# Patient Record
Sex: MALE | Race: WHITE | Employment: OTHER | URBAN - METROPOLITAN AREA
[De-identification: names, ages, dates, MRNs, and addresses within clinical notes are randomized per-mention and may not be internally consistent; named-entity substitution may affect disease eponyms.]

---

## 2017-12-20 PROBLEM — N40.1 BPH WITH OBSTRUCTION/LOWER URINARY TRACT SYMPTOMS: Status: ACTIVE | Noted: 2017-12-20

## 2017-12-20 PROBLEM — N48.6 PEYRONIE DISEASE: Status: ACTIVE | Noted: 2017-12-20

## 2017-12-20 PROBLEM — R97.20 ELEVATED PSA: Status: ACTIVE | Noted: 2017-12-20

## 2017-12-20 PROBLEM — N13.8 BPH WITH OBSTRUCTION/LOWER URINARY TRACT SYMPTOMS: Status: ACTIVE | Noted: 2017-12-20

## 2023-01-26 PROBLEM — N52.8 OTHER MALE ERECTILE DYSFUNCTION: Status: ACTIVE | Noted: 2023-01-26

## 2023-04-04 ENCOUNTER — ANESTHESIA EVENT (OUTPATIENT)
Dept: OPERATING ROOM | Age: 73
End: 2023-04-04
Payer: MEDICARE

## 2023-04-04 ENCOUNTER — HOSPITAL ENCOUNTER (OUTPATIENT)
Dept: ULTRASOUND IMAGING | Age: 73
Setting detail: OUTPATIENT SURGERY
Discharge: HOME | End: 2023-04-06
Attending: SPECIALIST
Payer: MEDICARE

## 2023-04-04 ENCOUNTER — ANESTHESIA (OUTPATIENT)
Dept: OPERATING ROOM | Age: 73
End: 2023-04-04
Payer: MEDICARE

## 2023-04-04 ENCOUNTER — HOSPITAL ENCOUNTER (OUTPATIENT)
Age: 73
Setting detail: OUTPATIENT SURGERY
Discharge: HOME | End: 2023-04-04
Attending: SPECIALIST | Admitting: SPECIALIST
Payer: MEDICARE

## 2023-04-04 VITALS
HEART RATE: 53 BPM | WEIGHT: 200 LBS | BODY MASS INDEX: 27.09 KG/M2 | TEMPERATURE: 97.3 F | SYSTOLIC BLOOD PRESSURE: 142 MMHG | DIASTOLIC BLOOD PRESSURE: 78 MMHG | RESPIRATION RATE: 19 BRPM | HEIGHT: 72 IN | OXYGEN SATURATION: 98 %

## 2023-04-04 DIAGNOSIS — R97.20 ELEVATED PSA: ICD-10-CM

## 2023-04-04 LAB
EGFR, POC: >60 ML/MIN/1.73M2
GLUCOSE BLD-MCNC: 105 MG/DL (ref 74–100)
POC BUN: 13 MG/DL (ref 8–26)
POC CHLORIDE: 101 MMOL/L (ref 98–107)
POC CREATININE: 0.79 MG/DL (ref 0.51–1.19)
POC IONIZED CALCIUM: 1.28 MMOL/L (ref 1.15–1.33)
POC POTASSIUM: 3.7 MMOL/L (ref 3.5–4.5)
POC SODIUM: 139 MMOL/L (ref 138–146)

## 2023-04-04 PROCEDURE — 76942 ECHO GUIDE FOR BIOPSY: CPT

## 2023-04-04 PROCEDURE — 82435 ASSAY OF BLOOD CHLORIDE: CPT

## 2023-04-04 PROCEDURE — 82565 ASSAY OF CREATININE: CPT

## 2023-04-04 PROCEDURE — 3700000001 HC ADD 15 MINUTES (ANESTHESIA): Performed by: SPECIALIST

## 2023-04-04 PROCEDURE — 84295 ASSAY OF SERUM SODIUM: CPT

## 2023-04-04 PROCEDURE — 82947 ASSAY GLUCOSE BLOOD QUANT: CPT

## 2023-04-04 PROCEDURE — 6360000002 HC RX W HCPCS: Performed by: REGISTERED NURSE

## 2023-04-04 PROCEDURE — 7100000010 HC PHASE II RECOVERY - FIRST 15 MIN: Performed by: SPECIALIST

## 2023-04-04 PROCEDURE — 2580000003 HC RX 258: Performed by: REGISTERED NURSE

## 2023-04-04 PROCEDURE — 2580000003 HC RX 258: Performed by: SPECIALIST

## 2023-04-04 PROCEDURE — 6360000002 HC RX W HCPCS: Performed by: SPECIALIST

## 2023-04-04 PROCEDURE — 84132 ASSAY OF SERUM POTASSIUM: CPT

## 2023-04-04 PROCEDURE — 88305 TISSUE EXAM BY PATHOLOGIST: CPT

## 2023-04-04 PROCEDURE — 7100000011 HC PHASE II RECOVERY - ADDTL 15 MIN: Performed by: SPECIALIST

## 2023-04-04 PROCEDURE — 3600000012 HC SURGERY LEVEL 2 ADDTL 15MIN: Performed by: SPECIALIST

## 2023-04-04 PROCEDURE — 82330 ASSAY OF CALCIUM: CPT

## 2023-04-04 PROCEDURE — 2500000003 HC RX 250 WO HCPCS: Performed by: SPECIALIST

## 2023-04-04 PROCEDURE — 3700000000 HC ANESTHESIA ATTENDED CARE: Performed by: SPECIALIST

## 2023-04-04 PROCEDURE — 2500000003 HC RX 250 WO HCPCS: Performed by: REGISTERED NURSE

## 2023-04-04 PROCEDURE — 2709999900 HC NON-CHARGEABLE SUPPLY: Performed by: SPECIALIST

## 2023-04-04 PROCEDURE — 84520 ASSAY OF UREA NITROGEN: CPT

## 2023-04-04 PROCEDURE — 3600000002 HC SURGERY LEVEL 2 BASE: Performed by: SPECIALIST

## 2023-04-04 RX ORDER — SODIUM CHLORIDE, SODIUM LACTATE, POTASSIUM CHLORIDE, CALCIUM CHLORIDE 600; 310; 30; 20 MG/100ML; MG/100ML; MG/100ML; MG/100ML
INJECTION, SOLUTION INTRAVENOUS CONTINUOUS
Status: DISCONTINUED | OUTPATIENT
Start: 2023-04-04 | End: 2023-04-04 | Stop reason: HOSPADM

## 2023-04-04 RX ORDER — LIDOCAINE HYDROCHLORIDE 10 MG/ML
INJECTION, SOLUTION EPIDURAL; INFILTRATION; INTRACAUDAL; PERINEURAL PRN
Status: DISCONTINUED | OUTPATIENT
Start: 2023-04-04 | End: 2023-04-04 | Stop reason: ALTCHOICE

## 2023-04-04 RX ORDER — LIDOCAINE HYDROCHLORIDE 10 MG/ML
INJECTION, SOLUTION EPIDURAL; INFILTRATION; INTRACAUDAL; PERINEURAL PRN
Status: DISCONTINUED | OUTPATIENT
Start: 2023-04-04 | End: 2023-04-04 | Stop reason: SDUPTHER

## 2023-04-04 RX ORDER — SODIUM CHLORIDE, SODIUM LACTATE, POTASSIUM CHLORIDE, CALCIUM CHLORIDE 600; 310; 30; 20 MG/100ML; MG/100ML; MG/100ML; MG/100ML
INJECTION, SOLUTION INTRAVENOUS CONTINUOUS PRN
Status: DISCONTINUED | OUTPATIENT
Start: 2023-04-04 | End: 2023-04-04 | Stop reason: SDUPTHER

## 2023-04-04 RX ORDER — PROPOFOL 10 MG/ML
INJECTION, EMULSION INTRAVENOUS CONTINUOUS PRN
Status: DISCONTINUED | OUTPATIENT
Start: 2023-04-04 | End: 2023-04-04 | Stop reason: SDUPTHER

## 2023-04-04 RX ADMIN — PROPOFOL 150 MCG/KG/MIN: 10 INJECTION, EMULSION INTRAVENOUS at 08:40

## 2023-04-04 RX ADMIN — CEFTRIAXONE SODIUM 1000 MG: 1 INJECTION, POWDER, FOR SOLUTION INTRAMUSCULAR; INTRAVENOUS at 08:44

## 2023-04-04 RX ADMIN — LIDOCAINE HYDROCHLORIDE 50 MG: 10 INJECTION, SOLUTION EPIDURAL; INFILTRATION; INTRACAUDAL; PERINEURAL at 08:39

## 2023-04-04 RX ADMIN — SODIUM CHLORIDE, POTASSIUM CHLORIDE, SODIUM LACTATE AND CALCIUM CHLORIDE: 600; 310; 30; 20 INJECTION, SOLUTION INTRAVENOUS at 08:25

## 2023-04-04 RX ADMIN — SODIUM CHLORIDE, POTASSIUM CHLORIDE, SODIUM LACTATE AND CALCIUM CHLORIDE: 600; 310; 30; 20 INJECTION, SOLUTION INTRAVENOUS at 08:55

## 2023-04-04 ASSESSMENT — PAIN SCALES - GENERAL
PAINLEVEL_OUTOF10: 4
PAINLEVEL_OUTOF10: 0
PAINLEVEL_OUTOF10: 4
PAINLEVEL_OUTOF10: 4

## 2023-04-04 ASSESSMENT — PAIN - FUNCTIONAL ASSESSMENT: PAIN_FUNCTIONAL_ASSESSMENT: NONE - DENIES PAIN

## 2023-04-04 ASSESSMENT — PAIN DESCRIPTION - PAIN TYPE
TYPE: SURGICAL PAIN

## 2023-04-04 ASSESSMENT — PAIN DESCRIPTION - LOCATION
LOCATION: RECTUM

## 2023-04-04 ASSESSMENT — PAIN DESCRIPTION - DESCRIPTORS
DESCRIPTORS: DISCOMFORT

## 2023-04-04 ASSESSMENT — LIFESTYLE VARIABLES: SMOKING_STATUS: 1

## 2023-04-04 NOTE — H&P
Urology History and Physical    Patient:  Jamin Alvarez  MRN: [id number]  YOB: 1950    CHIEF COMPLAINT:  elevated PSA    HISTORY OF PRESENT ILLNESS:   The patient is a 71 y.o. male    Patient is here today for history of an elevated PSA. His last several PSA values are as follows:        Lab Results   Component Value Date     PSA 8.45 11/16/2022     PSA 4.52 11/04/2017     PSA 5.19 10/27/2017     PSA 5.19 10/27/2017     Patient has a suspicious 11 mm lesion on recent prostate MRI. Patient's old records, notes and chart reviewed and summarized above. Past Medical History:    Past Medical History:   Diagnosis Date    Arthritis     general    BPH with obstruction/lower urinary tract symptoms     COVID-19 12/2020    tired X 1 week, no treatment or hospitalization    Elevated PSA     Facial basal cell cancer     GERD (gastroesophageal reflux disease)     Hemorrhoids     Hyperlipidemia     Hypertension     Rectal fissure     Under care of team     Urology/Dr. Angelica Obrien MD/ [place]/ last seen 1-2023    Wears glasses     Wellness examination     PCP Seth Mock DO/[place], [place]/ last seen 1-2023       Past Surgical History:    Past Surgical History:   Procedure Laterality Date    COLONOSCOPY      TONSILLECTOMY         Medications:    Scheduled Meds:  Continuous Infusions:  PRN Meds:. Allergies:  Patient has no known allergies.     Social History:    Social History     Socioeconomic History    Marital status:      Spouse name: Not on file    Number of children: Not on file    Years of education: Not on file    Highest education level: Not on file   Occupational History    Not on file   Tobacco Use    Smoking status: Some Days     Types: Cigars    Smokeless tobacco: Never   Vaping Use    Vaping Use: Never used   Substance and Sexual Activity    Alcohol use: Yes     Comment: social    Drug use: Not Currently    Sexual activity: Yes     Partners: Female   Other Topics Concern    Not on file

## 2023-04-04 NOTE — OP NOTE
Kendra Love MD FACS    FACILITY:  [address] [place], [place]  DATE:  4/4/23  Josh Boudraeux       Surgeon: Morales Velasco MD   Assistant: Alexandra Xavier MD PGY3  Preoperative diagnosis: Elevated prostatic specific antigen. Abnormal prostate MRI. Postoperative diagnosis: Same  Procedure: Transrectal ultrasound of the prostate with fusion prostate biopsy. Anesthesia: MAC and local  Specimen: Prostate biopsy specimens  ID Type Source Tests Collected by Time Destination   A : PROSTATE TISSUE BIOPSY X 12 Tissue Prostate SURGICAL PATHOLOGY Augustina Alvarez MD 4/4/2023 0841    B : MID TO APEX TRANSITIONAL TARGET LEFT. Tissue Prostate SURGICAL PATHOLOGY Augustina Alvarez MD 4/4/2023 4215        COMPLICATIONS:  None. ESTIMATED BLOOD LOSS: Minimal   Drains: None  Follow-up: Dr Cedrick Love will call with the results    INDICATIONS:  Pt is a 72 yo M who has history of elevated PSA. He was found to have a 11 mm periurethral lesion lesion in MRI. He is here today for MRI ultrasound fusion biopsy,  the risks, benefits and alternatives were explained and informed consent was signed. OPERATIVE NARRATIVE:  The patient was brought to the operating room. He was properly identified. The procedure was confirmed verbally. The patient was administered a monitored anesthetic. He was placed in the lateral decubitus position. The ultrasound probe was placed into the rectum and prostatography ensued. The [employer]Â® workstation was coupled to the ultrasound probe and using the device, a series of ultrasonic images of the prostate, seminal vesicles and base of the bladder were obtained. These images were then subsequently reconstructed in 3D space using the [employer] [employer] The MRI images were imported to this workstation and subsequent fusion of ultrasound and MRI images were performed.   MR/ultrasound fusion prostate model planning and reconstruction then occurred and there was one region of

## 2023-04-04 NOTE — DISCHARGE INSTRUCTIONS
Prostate Biopsy Discharge Instructions:    Complete entire course of antibiotics as prescribed. Take prescriptions as directed. No driving while taking narcotic pain medication. Hold blood thinners after biopsy. May resume bloodthinners on 4/11/23  Please call attending physician or hospital  with questions. Please call or present to ED for fever >101 F, shaking, chills, intractable nausea and vomiting, or uncontrolled pain. OK to shower after discharge. You may see blood in your urine, stools, and semen for up to 6 weeks. This is normal.   Follow up with Dr. Rashaun Quach in 1-2 weeks to discuss biopsy results. Call office to confirm appointment. No alcoholic beverages, no driving or operating machinery, no making important decisions for 24 hours. You may have a normal diet but should eat lightly day of surgery. Drink plenty of fluids.   Urinate within 8 hours after surgery, if unable to urinate call your doctor

## 2023-04-04 NOTE — ANESTHESIA PRE PROCEDURE
Department of Anesthesiology  Preprocedure Note       Name:  Dann Pickett   Age:  71 y.o.  :  1950                                          MRN:  [id number]         Date:  2023      Surgeon: Karma Timmons):  Vane Hall MD    Procedure: Procedure(s):  MR FUSION PROSTATE BIOPSY ULTRASOUND    Medications prior to admission:   Prior to Admission medications    Medication Sig Start Date End Date Taking?  Authorizing Provider   ciprofloxacin (CIPRO) 500 MG tablet Take 1 tablet by mouth 2 times daily Take first dose night prior to prostate biopsy and then twice a day thereafter until completed 23   Vane Hall MD   cephALEXin (KEFLEX) 500 MG capsule Take 1 capsule by mouth 3 times daily Take first dose night prior to prostate biopsy and then three times a day thereafter until completed 23   Vane Hall MD   amLODIPine (NORVASC) 5 MG tablet Take 1 tablet by mouth daily    Historical Provider, MD   tadalafil (CIALIS) 20 MG tablet Take 1 tablet by mouth as needed for Erectile Dysfunction 23   Vane Hall MD   lisinopril-hydrochlorothiazide (PRINZIDE;ZESTORETIC) 20-12.5 MG per tablet Take 1 tablet by mouth daily 10/31/17   Historical Provider, MD       Current medications:    Current Facility-Administered Medications   Medication Dose Route Frequency Provider Last Rate Last Admin    cefTRIAXone (ROCEPHIN) 1,000 mg in sterile water 10 mL IV syringe  1,000 mg IntraVENous Once Vane Hall MD        lactated ringers IV soln infusion   IntraVENous Continuous Pedro Mcpherson MD           Allergies:  No Known Allergies    Problem List:    Patient Active Problem List   Diagnosis Code    Elevated PSA R97.20    BPH with obstruction/lower urinary tract symptoms N40.1, N13.8    Peyronie disease N48.6    Other male erectile dysfunction N52.8       Past Medical History:        Diagnosis Date    Arthritis     general    BPH with obstruction/lower urinary tract

## 2023-04-04 NOTE — ANESTHESIA POSTPROCEDURE EVALUATION
Department of Anesthesiology  Postprocedure Note    Patient: Shelly Paredes  MRN: [id number]  [place]: 1950  Date of evaluation: 4/4/2023      Procedure Summary     Date: 04/04/23 Room / Location: [address]    Anesthesia Start: 8236 Anesthesia Stop: 4563    Procedure: MR FUSION PROSTATE BIOPSY ULTRASOUND Diagnosis:       Elevated PSA      (ELEVATED PSA)    Surgeons: Erlinda Santo MD Responsible Provider: Dustin Meek MD    Anesthesia Type: MAC ASA Status: 2          Anesthesia Type: No value filed.     Vickey Phase I: Vickey Score: 10    Vickey Phase II: Vickey Score: 10      Anesthesia Post Evaluation    Patient location during evaluation: PACU  Patient participation: complete - patient participated  Level of consciousness: awake  Pain score: 4  Cardiovascular status: hemodynamically stable  Respiratory status: room air

## 2023-04-04 NOTE — LETTER
Winnie Kwon MD [facility], [address] [place] [address],[address]  [place], [facility]  P: [phone] / F: [phone]    Brief Postoperative Note  Surgical Facility: [address] [place] [address]   4/4/23    Екатерина Cruz  [id number]  1950    Dear Jarrett Ask, DO,    I've enclosed a Brief Op note on a procedure performed on your patient, Екатерина Cruz today. Pre-operative Diagnosis: Elevated PSA and abnormal prostate MRI  Post-operative Diagnosis: Same    Procedure: Prostate U/S and MR fusion guided biopsy of prostate      Anesthesia: MAC    Surgeon: Mer Luong; Resident: Taye Hung MD     Findings: Patient target lesion at anterior left/midline base biopsied as well as standard random template. Plan: Follow up depending on results of prostate biopsy pathology. Thank you for allowing me to participate in the care of this patient. I will keep you updated on this patient's follow up and I look forward to serving you and your patients again in the future.         Electronically signed by Blake Dunn MD, FACS 55

## 2023-04-06 LAB — SURGICAL PATHOLOGY REPORT: NORMAL

## 2023-04-14 ENCOUNTER — HOSPITAL ENCOUNTER (OUTPATIENT)
Dept: NUCLEAR MEDICINE | Age: 73
Discharge: HOME | End: 2023-04-16
Payer: MEDICARE

## 2023-04-14 DIAGNOSIS — C61 PROSTATE CANCER (HCC): ICD-10-CM

## 2023-04-14 PROCEDURE — A9595 HC RX CONTRAST MEDICATION: HCPCS | Performed by: SPECIALIST

## 2023-04-14 PROCEDURE — 78815 PET IMAGE W/CT SKULL-THIGH: CPT

## 2023-04-14 PROCEDURE — 6360000004 HC RX CONTRAST MEDICATION: Performed by: SPECIALIST

## 2023-04-14 PROCEDURE — 2580000003 HC RX 258: Performed by: SPECIALIST

## 2023-04-14 RX ORDER — SODIUM CHLORIDE 0.9 % (FLUSH) 0.9 %
10 SYRINGE (ML) INJECTION PRN
Status: DISCONTINUED | OUTPATIENT
Start: 2023-04-14 | End: 2023-04-17 | Stop reason: HOSPADM

## 2023-04-14 RX ADMIN — PIFLUFOLASTAT F-18 10.33 MILLICURIE: 80 INJECTION INTRAVENOUS at 12:41

## 2023-04-14 RX ADMIN — SODIUM CHLORIDE, PRESERVATIVE FREE 10 ML: 5 INJECTION INTRAVENOUS at 12:42

## 2023-04-21 PROBLEM — C61 PROSTATE CANCER (HCC): Status: ACTIVE | Noted: 2023-04-21

## 2023-06-01 RX ORDER — SODIUM CHLORIDE, SODIUM LACTATE, POTASSIUM CHLORIDE, CALCIUM CHLORIDE 600; 310; 30; 20 MG/100ML; MG/100ML; MG/100ML; MG/100ML
1000 INJECTION, SOLUTION INTRAVENOUS CONTINUOUS
OUTPATIENT
Start: 2023-06-01

## 2023-06-06 ENCOUNTER — HOSPITAL ENCOUNTER (OUTPATIENT)
Dept: PREADMISSION TESTING | Age: 73
Discharge: HOME | End: 2023-06-10
Payer: MEDICARE

## 2023-06-06 VITALS
HEART RATE: 50 BPM | SYSTOLIC BLOOD PRESSURE: 124 MMHG | HEIGHT: 72 IN | OXYGEN SATURATION: 98 % | DIASTOLIC BLOOD PRESSURE: 76 MMHG | TEMPERATURE: 97.3 F | WEIGHT: 202 LBS | RESPIRATION RATE: 18 BRPM | BODY MASS INDEX: 27.36 KG/M2

## 2023-06-06 LAB
ANION GAP SERPL CALCULATED.3IONS-SCNC: 12 MMOL/L (ref 9–17)
BUN SERPL-MCNC: 15 MG/DL (ref 8–23)
CHLORIDE SERPL-SCNC: 104 MMOL/L (ref 98–107)
CO2 SERPL-SCNC: 25 MMOL/L (ref 20–31)
CREAT SERPL-MCNC: 0.8 MG/DL (ref 0.7–1.2)
ERYTHROCYTE [DISTWIDTH] IN BLOOD BY AUTOMATED COUNT: 13.2 % (ref 11.8–14.4)
GFR SERPL CREATININE-BSD FRML MDRD: >60 ML/MIN/1.73M2
GLUCOSE SERPL-MCNC: 91 MG/DL (ref 70–99)
HCT VFR BLD AUTO: 42.7 % (ref 40.7–50.3)
HGB BLD-MCNC: 14.4 G/DL (ref 13–17)
MCH RBC QN AUTO: 31.4 PG (ref 25.2–33.5)
MCHC RBC AUTO-ENTMCNC: 33.7 G/DL (ref 28.4–34.8)
MCV RBC AUTO: 93 FL (ref 82.6–102.9)
NRBC AUTOMATED: 0 PER 100 WBC
PLATELET # BLD AUTO: 191 K/UL (ref 138–453)
PMV BLD AUTO: 10.3 FL (ref 8.1–13.5)
POTASSIUM SERPL-SCNC: 4.2 MMOL/L (ref 3.7–5.3)
RBC # BLD AUTO: 4.59 M/UL (ref 4.21–5.77)
SODIUM SERPL-SCNC: 141 MMOL/L (ref 135–144)
WBC OTHER # BLD: 5.4 K/UL (ref 3.5–11.3)

## 2023-06-06 PROCEDURE — 85027 COMPLETE CBC AUTOMATED: CPT

## 2023-06-06 PROCEDURE — 36415 COLL VENOUS BLD VENIPUNCTURE: CPT

## 2023-06-06 PROCEDURE — 82565 ASSAY OF CREATININE: CPT

## 2023-06-06 PROCEDURE — 82947 ASSAY GLUCOSE BLOOD QUANT: CPT

## 2023-06-06 PROCEDURE — 80051 ELECTROLYTE PANEL: CPT

## 2023-06-06 PROCEDURE — 84520 ASSAY OF UREA NITROGEN: CPT

## 2023-06-06 RX ORDER — ROSUVASTATIN CALCIUM 40 MG/1
40 TABLET, COATED ORAL EVERY EVENING
COMMUNITY

## 2023-06-06 RX ORDER — TELMISARTAN AND HYDROCHLORTHIAZIDE 80; 25 MG/1; MG/1
1 TABLET ORAL DAILY
COMMUNITY

## 2023-06-06 NOTE — PROGRESS NOTES
Anesthesia Focused Assessment    Hx of anesthesia complications:  no  Family hx of anesthesia complications:  no      Tested positive for Covid-19 in last 8 weeks: no      STOP-BANG Sleep Apnea Questionnaire    SNORE loudly (heard through closed doors)? Yes  TIRED, fatigued, sleepy during daytime? No  OBSERVED stopping breathing during sleep? No  High blood PRESSURE or being treated? Yes    BMI over 35? No  AGE over 48? Yes  NECK circumference over 16\"? No  GENDER (male)? Yes             Total 4  High risk 5-8  Intermediate risk 3-4  Low risk 0-2    ----------------------------------------------------------------------------------------------------------------------  JOSEPH                              No  If yes, machine? DM1                                            No  DM2                   No    Coronary Artery Disease      No  HTN         Yes, on meds  Defib/AICD/Pacemaker               No             Renal Failure                   No  If yes, on dialysis           Active smoker? Yes, cigars 3-4 per week  Drinks alcohol? Yes, occasional  Illicit drugs?         No  Dentition?        benign      Past Medical History:   Diagnosis Date    Arthritis     general    BPH with obstruction/lower urinary tract symptoms     COVID-19 12/2020    tired X 1 week, no treatment or hospitalization    Elevated PSA     Facial basal cell cancer     GERD (gastroesophageal reflux disease)     Hemorrhoids     Hyperlipidemia     Hypertension     Rectal fissure     TIA (transient ischemic attack)     states possible one due to vision changes lasting 30 seconds-- early 2000's    Under care of service provider 06/06/2023    [id number] to visit june 2023    Under care of team 06/06/2023    Urology/Dr. Anton Boss MD/ [place]/ due to visit june 2023    Wears glasses     Wellness examination 06/06/2023    PCP Jackeline Monroe DO/[place]/ last seen 1-2023         Patient was

## 2023-06-07 ENCOUNTER — INITIAL CONSULT (OUTPATIENT)
Age: 73
End: 2023-06-07
Payer: MEDICARE

## 2023-06-07 DIAGNOSIS — C61 PROSTATE CANCER (HCC): Primary | ICD-10-CM

## 2023-06-07 PROCEDURE — 1123F ACP DISCUSS/DSCN MKR DOCD: CPT | Performed by: SPECIALIST

## 2023-06-07 PROCEDURE — 99213 OFFICE O/P EST LOW 20 MIN: CPT | Performed by: SPECIALIST

## 2023-06-07 NOTE — PROGRESS NOTES
06/07/23. Last several PSA's:  Lab Results   Component Value Date    PSA 8.45 11/16/2022    PSA 4.52 11/04/2017    PSA 5.19 10/27/2017    PSA 5.19 10/27/2017       Last total testosterone:  No results found for: TESTOSTERONE    Last BUN and creatinine:  Lab Results   Component Value Date    BUN 15 06/06/2023     Lab Results   Component Value Date    CREATININE 0.80 06/06/2023       Last CBC:  Lab Results   Component Value Date    WBC 5.4 06/06/2023    HGB 14.4 06/06/2023    HCT 42.7 06/06/2023    MCV 93.0 06/06/2023     06/06/2023       Additional Lab/Culture results: none    Imaging Reviewed during this Office Visit: none  (results were independently reviewed by physician and radiology report verified)    Physical Exam:    There were no vitals filed for this visit. There is no height or weight on file to calculate BMI. Patient is a 71 y.o. male in no acute distress and alert and oriented to person, place and time. Assessment and Plan      1. Prostate cancer [facility])           Plan:      Here is here today to discuss his upcoming [place] prostatectomy at [facility] on 6/16/23. I discussed the various risks and benefits of robotic assisted laparoscopic prostatectomy and the patient understands and signed the informed consent form. We discussed the need for a 1 day preoperative bowel prep and he was given verbal and written instructions regarding this. We also discussed the benefit of doing pelvic floor muscle exercises (Kegel exercises) preop and postoperatively to speed up recovery of his continence. Ada Shore MD FACS  6/7/2023 8:32 PM    2023 Quality Measure Documentation: N/A  Social History     Tobacco Use   Smoking Status Some Days    Types: Cigars   Smokeless Tobacco Never     (If patient a smoker, smoking cessation counseling offered)

## 2023-06-15 NOTE — H&P
[facility] Urology  Calin Marin. Alexey Brown MD [facility]    Pre-op History and Physical      Patient:  Aleida Sewell  MRN: [id number]  YOB: 1950    HISTORY OF PRESENT ILLNESS:     The patient is a 71 y.o. male who presents with unfavorable intermediate prostate cancer. Here for robotic laparoscopic radical prostatectomy with possible bilateral pelvic lymph node dissection. Patient's old records, notes and chart reviewed and summarized above. Past Medical History:    Past Medical History:   Diagnosis Date    Arthritis     general    BPH with obstruction/lower urinary tract symptoms     COVID-19 12/2020    tired X 1 week, no treatment or hospitalization    Elevated PSA     Facial basal cell cancer     GERD (gastroesophageal reflux disease)     Hemorrhoids     Hyperlipidemia     Hypertension     Rectal fissure     TIA (transient ischemic attack)     states possible one due to vision changes lasting 30 seconds-- early 2000's    Under care of service provider 06/06/2023    [id number] to visit june 2023    Under care of team 06/06/2023    Urology/Dr. Alexey Brown MD/ [place]/ due to visit june 2023    Wears glasses     Wellness examination 06/06/2023    PCP Moni Salcido DO/[place]/ last seen 1-2023       Past Surgical History:    Past Surgical History:   Procedure Laterality Date    COLONOSCOPY      PROSTATE BIOPSY  04/04/2023    PROSTATE SURGERY N/A 4/4/2023    MR FUSION PROSTATE BIOPSY ULTRASOUND performed by Jigna Gómez MD at [facility]         Medications Prior to Admission:    Prior to Admission medications    Medication Sig Start Date End Date Taking?  Authorizing Provider   rosuvastatin (CRESTOR) 40 MG tablet Take 1 tablet by mouth every evening    Historical Provider, MD   telmisartan-hydroCHLOROthiazide (MICARDIS HCT) 80-25 MG per tablet Take 1 tablet by mouth daily    Historical Provider, MD   amLODIPine (NORVASC) 10 MG tablet Take 1 tablet by

## 2023-06-16 ENCOUNTER — HOSPITAL ENCOUNTER (OUTPATIENT)
Age: 73
Setting detail: OBSERVATION
Discharge: HOME | End: 2023-06-17
Attending: SPECIALIST | Admitting: SPECIALIST
Payer: MEDICARE

## 2023-06-16 DIAGNOSIS — C61 PROSTATE CANCER (HCC): ICD-10-CM

## 2023-06-16 DIAGNOSIS — G89.18 ACUTE POST-OPERATIVE PAIN: Primary | ICD-10-CM

## 2023-06-16 LAB
ANION GAP SERPL CALCULATED.3IONS-SCNC: 12 MMOL/L (ref 9–17)
BUN SERPL-MCNC: 21 MG/DL (ref 8–23)
CALCIUM SERPL-MCNC: 8.9 MG/DL (ref 8.6–10.4)
CHLORIDE SERPL-SCNC: 99 MMOL/L (ref 98–107)
CO2 SERPL-SCNC: 23 MMOL/L (ref 20–31)
CREAT SERPL-MCNC: 0.97 MG/DL (ref 0.7–1.2)
ERYTHROCYTE [DISTWIDTH] IN BLOOD BY AUTOMATED COUNT: 12.9 % (ref 11.8–14.4)
GFR SERPL CREATININE-BSD FRML MDRD: >60 ML/MIN/1.73M2
GLUCOSE SERPL-MCNC: 160 MG/DL (ref 70–99)
HCT VFR BLD AUTO: 41 % (ref 40.7–50.3)
HGB BLD-MCNC: 14 G/DL (ref 13–17)
MCH RBC QN AUTO: 31.4 PG (ref 25.2–33.5)
MCHC RBC AUTO-ENTMCNC: 34.1 G/DL (ref 28.4–34.8)
MCV RBC AUTO: 91.9 FL (ref 82.6–102.9)
NRBC AUTOMATED: 0 PER 100 WBC
PLATELET # BLD AUTO: 176 K/UL (ref 138–453)
PMV BLD AUTO: 10.4 FL (ref 8.1–13.5)
POTASSIUM BLD-SCNC: 3.9 MMOL/L (ref 3.5–4.5)
POTASSIUM SERPL-SCNC: 3.6 MMOL/L (ref 3.7–5.3)
RBC # BLD AUTO: 4.46 M/UL (ref 4.21–5.77)
SODIUM SERPL-SCNC: 134 MMOL/L (ref 135–144)
WBC OTHER # BLD: 11.9 K/UL (ref 3.5–11.3)

## 2023-06-16 PROCEDURE — 85027 COMPLETE CBC AUTOMATED: CPT

## 2023-06-16 PROCEDURE — 6360000002 HC RX W HCPCS: Performed by: STUDENT IN AN ORGANIZED HEALTH CARE EDUCATION/TRAINING PROGRAM

## 2023-06-16 PROCEDURE — 2500000003 HC RX 250 WO HCPCS: Performed by: SPECIALIST

## 2023-06-16 PROCEDURE — 3700000000 HC ANESTHESIA ATTENDED CARE: Performed by: SPECIALIST

## 2023-06-16 PROCEDURE — G0378 HOSPITAL OBSERVATION PER HR: HCPCS

## 2023-06-16 PROCEDURE — 2709999900 HC NON-CHARGEABLE SUPPLY: Performed by: SPECIALIST

## 2023-06-16 PROCEDURE — 6370000000 HC RX 637 (ALT 250 FOR IP): Performed by: STUDENT IN AN ORGANIZED HEALTH CARE EDUCATION/TRAINING PROGRAM

## 2023-06-16 PROCEDURE — 2580000003 HC RX 258: Performed by: ANESTHESIOLOGY

## 2023-06-16 PROCEDURE — 3700000001 HC ADD 15 MINUTES (ANESTHESIA): Performed by: SPECIALIST

## 2023-06-16 PROCEDURE — 3600000019 HC SURGERY ROBOT ADDTL 15MIN: Performed by: SPECIALIST

## 2023-06-16 PROCEDURE — 2580000003 HC RX 258: Performed by: STUDENT IN AN ORGANIZED HEALTH CARE EDUCATION/TRAINING PROGRAM

## 2023-06-16 PROCEDURE — 88307 TISSUE EXAM BY PATHOLOGIST: CPT

## 2023-06-16 PROCEDURE — 88305 TISSUE EXAM BY PATHOLOGIST: CPT

## 2023-06-16 PROCEDURE — 7100000000 HC PACU RECOVERY - FIRST 15 MIN: Performed by: SPECIALIST

## 2023-06-16 PROCEDURE — 84132 ASSAY OF SERUM POTASSIUM: CPT

## 2023-06-16 PROCEDURE — 3600000009 HC SURGERY ROBOT BASE: Performed by: SPECIALIST

## 2023-06-16 PROCEDURE — 96372 THER/PROPH/DIAG INJ SC/IM: CPT

## 2023-06-16 PROCEDURE — 80048 BASIC METABOLIC PNL TOTAL CA: CPT

## 2023-06-16 PROCEDURE — S2900 ROBOTIC SURGICAL SYSTEM: HCPCS | Performed by: SPECIALIST

## 2023-06-16 PROCEDURE — 88309 TISSUE EXAM BY PATHOLOGIST: CPT

## 2023-06-16 PROCEDURE — 6370000000 HC RX 637 (ALT 250 FOR IP): Performed by: SPECIALIST

## 2023-06-16 PROCEDURE — 87086 URINE CULTURE/COLONY COUNT: CPT

## 2023-06-16 PROCEDURE — 2580000003 HC RX 258: Performed by: SPECIALIST

## 2023-06-16 PROCEDURE — 2720000010 HC SURG SUPPLY STERILE: Performed by: SPECIALIST

## 2023-06-16 PROCEDURE — C1889 IMPLANT/INSERT DEVICE, NOC: HCPCS | Performed by: SPECIALIST

## 2023-06-16 PROCEDURE — 7100000001 HC PACU RECOVERY - ADDTL 15 MIN: Performed by: SPECIALIST

## 2023-06-16 DEVICE — CLIP INT L POLYMER LOK LIG HEM O LOK: Type: IMPLANTABLE DEVICE | Status: FUNCTIONAL

## 2023-06-16 RX ORDER — SODIUM CHLORIDE 0.9 % (FLUSH) 0.9 %
5-40 SYRINGE (ML) INJECTION PRN
Status: DISCONTINUED | OUTPATIENT
Start: 2023-06-16 | End: 2023-06-16 | Stop reason: HOSPADM

## 2023-06-16 RX ORDER — MAGNESIUM HYDROXIDE 1200 MG/15ML
LIQUID ORAL CONTINUOUS PRN
Status: COMPLETED | OUTPATIENT
Start: 2023-06-16 | End: 2023-06-16

## 2023-06-16 RX ORDER — MORPHINE SULFATE 2 MG/ML
INJECTION, SOLUTION INTRAMUSCULAR; INTRAVENOUS
Status: DISPENSED
Start: 2023-06-16 | End: 2023-06-17

## 2023-06-16 RX ORDER — PHENAZOPYRIDINE HYDROCHLORIDE 100 MG/1
100 TABLET, FILM COATED ORAL EVERY 6 HOURS PRN
Status: DISCONTINUED | OUTPATIENT
Start: 2023-06-16 | End: 2023-06-17 | Stop reason: HOSPADM

## 2023-06-16 RX ORDER — CALCIUM CARBONATE 500 MG/1
500 TABLET, CHEWABLE ORAL 3 TIMES DAILY PRN
Status: DISCONTINUED | OUTPATIENT
Start: 2023-06-16 | End: 2023-06-17 | Stop reason: HOSPADM

## 2023-06-16 RX ORDER — CHOLECALCIFEROL (VITAMIN D3) 125 MCG
5 CAPSULE ORAL NIGHTLY PRN
Status: DISCONTINUED | OUTPATIENT
Start: 2023-06-16 | End: 2023-06-17 | Stop reason: HOSPADM

## 2023-06-16 RX ORDER — ROSUVASTATIN CALCIUM 20 MG/1
40 TABLET, COATED ORAL EVERY EVENING
Status: DISCONTINUED | OUTPATIENT
Start: 2023-06-16 | End: 2023-06-17 | Stop reason: HOSPADM

## 2023-06-16 RX ORDER — OXYCODONE HYDROCHLORIDE 5 MG/1
10 TABLET ORAL EVERY 4 HOURS PRN
Status: DISCONTINUED | OUTPATIENT
Start: 2023-06-16 | End: 2023-06-17 | Stop reason: HOSPADM

## 2023-06-16 RX ORDER — METOPROLOL TARTRATE 5 MG/5ML
5 INJECTION INTRAVENOUS EVERY 6 HOURS PRN
Status: DISCONTINUED | OUTPATIENT
Start: 2023-06-16 | End: 2023-06-17 | Stop reason: HOSPADM

## 2023-06-16 RX ORDER — MAGNESIUM SULFATE IN WATER 40 MG/ML
2000 INJECTION, SOLUTION INTRAVENOUS PRN
Status: DISCONTINUED | OUTPATIENT
Start: 2023-06-16 | End: 2023-06-17 | Stop reason: HOSPADM

## 2023-06-16 RX ORDER — SODIUM CHLORIDE 9 MG/ML
INJECTION, SOLUTION INTRAVENOUS PRN
Status: DISCONTINUED | OUTPATIENT
Start: 2023-06-16 | End: 2023-06-17 | Stop reason: HOSPADM

## 2023-06-16 RX ORDER — SODIUM CHLORIDE 0.9 % (FLUSH) 0.9 %
5-40 SYRINGE (ML) INJECTION PRN
Status: DISCONTINUED | OUTPATIENT
Start: 2023-06-16 | End: 2023-06-17 | Stop reason: HOSPADM

## 2023-06-16 RX ORDER — PROMETHAZINE HYDROCHLORIDE 12.5 MG/1
12.5 TABLET ORAL EVERY 6 HOURS PRN
Status: DISCONTINUED | OUTPATIENT
Start: 2023-06-16 | End: 2023-06-17 | Stop reason: HOSPADM

## 2023-06-16 RX ORDER — OXYBUTYNIN CHLORIDE 5 MG/1
5 TABLET, EXTENDED RELEASE ORAL DAILY PRN
Status: DISCONTINUED | OUTPATIENT
Start: 2023-06-16 | End: 2023-06-17 | Stop reason: HOSPADM

## 2023-06-16 RX ORDER — HYDRALAZINE HYDROCHLORIDE 20 MG/ML
10 INJECTION INTRAMUSCULAR; INTRAVENOUS EVERY 6 HOURS PRN
Status: DISCONTINUED | OUTPATIENT
Start: 2023-06-16 | End: 2023-06-17 | Stop reason: HOSPADM

## 2023-06-16 RX ORDER — SODIUM CHLORIDE 0.9 % (FLUSH) 0.9 %
5-40 SYRINGE (ML) INJECTION EVERY 12 HOURS SCHEDULED
Status: DISCONTINUED | OUTPATIENT
Start: 2023-06-16 | End: 2023-06-16 | Stop reason: HOSPADM

## 2023-06-16 RX ORDER — MORPHINE SULFATE 2 MG/ML
2 INJECTION, SOLUTION INTRAMUSCULAR; INTRAVENOUS
Status: DISCONTINUED | OUTPATIENT
Start: 2023-06-16 | End: 2023-06-17 | Stop reason: HOSPADM

## 2023-06-16 RX ORDER — FENTANYL CITRATE 50 UG/ML
25 INJECTION, SOLUTION INTRAMUSCULAR; INTRAVENOUS EVERY 5 MIN PRN
Status: COMPLETED | OUTPATIENT
Start: 2023-06-16 | End: 2023-06-16

## 2023-06-16 RX ORDER — POTASSIUM CHLORIDE 7.45 MG/ML
10 INJECTION INTRAVENOUS PRN
Status: DISCONTINUED | OUTPATIENT
Start: 2023-06-16 | End: 2023-06-17 | Stop reason: HOSPADM

## 2023-06-16 RX ORDER — BUPIVACAINE HYDROCHLORIDE AND EPINEPHRINE 5; 5 MG/ML; UG/ML
INJECTION, SOLUTION PERINEURAL PRN
Status: DISCONTINUED | OUTPATIENT
Start: 2023-06-16 | End: 2023-06-16 | Stop reason: ALTCHOICE

## 2023-06-16 RX ORDER — POLYETHYLENE GLYCOL 3350 17 G/17G
17 POWDER, FOR SOLUTION ORAL DAILY
Status: DISCONTINUED | OUTPATIENT
Start: 2023-06-16 | End: 2023-06-17 | Stop reason: HOSPADM

## 2023-06-16 RX ORDER — MORPHINE SULFATE 2 MG/ML
2 INJECTION, SOLUTION INTRAMUSCULAR; INTRAVENOUS EVERY 5 MIN PRN
Status: COMPLETED | OUTPATIENT
Start: 2023-06-16 | End: 2023-06-16

## 2023-06-16 RX ORDER — MAGNESIUM HYDROXIDE 1200 MG/15ML
LIQUID ORAL PRN
Status: DISCONTINUED | OUTPATIENT
Start: 2023-06-16 | End: 2023-06-16 | Stop reason: ALTCHOICE

## 2023-06-16 RX ORDER — SODIUM CHLORIDE, SODIUM LACTATE, POTASSIUM CHLORIDE, CALCIUM CHLORIDE 600; 310; 30; 20 MG/100ML; MG/100ML; MG/100ML; MG/100ML
1000 INJECTION, SOLUTION INTRAVENOUS CONTINUOUS
Status: DISCONTINUED | OUTPATIENT
Start: 2023-06-16 | End: 2023-06-16 | Stop reason: HOSPADM

## 2023-06-16 RX ORDER — OXYCODONE HYDROCHLORIDE 5 MG/1
5 TABLET ORAL EVERY 4 HOURS PRN
Status: DISCONTINUED | OUTPATIENT
Start: 2023-06-16 | End: 2023-06-17 | Stop reason: HOSPADM

## 2023-06-16 RX ORDER — SODIUM CHLORIDE 9 MG/ML
INJECTION, SOLUTION INTRAVENOUS CONTINUOUS
Status: DISCONTINUED | OUTPATIENT
Start: 2023-06-16 | End: 2023-06-17 | Stop reason: HOSPADM

## 2023-06-16 RX ORDER — ACETAMINOPHEN 325 MG/1
650 TABLET ORAL EVERY 6 HOURS
Status: DISCONTINUED | OUTPATIENT
Start: 2023-06-16 | End: 2023-06-17 | Stop reason: HOSPADM

## 2023-06-16 RX ORDER — HEPARIN SODIUM 5000 [USP'U]/ML
5000 INJECTION, SOLUTION INTRAVENOUS; SUBCUTANEOUS ONCE
Status: COMPLETED | OUTPATIENT
Start: 2023-06-16 | End: 2023-06-16

## 2023-06-16 RX ORDER — MORPHINE SULFATE 4 MG/ML
4 INJECTION, SOLUTION INTRAMUSCULAR; INTRAVENOUS
Status: DISCONTINUED | OUTPATIENT
Start: 2023-06-16 | End: 2023-06-17 | Stop reason: HOSPADM

## 2023-06-16 RX ORDER — ONDANSETRON 2 MG/ML
4 INJECTION INTRAMUSCULAR; INTRAVENOUS EVERY 6 HOURS PRN
Status: DISCONTINUED | OUTPATIENT
Start: 2023-06-16 | End: 2023-06-17 | Stop reason: HOSPADM

## 2023-06-16 RX ORDER — LIDOCAINE HYDROCHLORIDE 20 MG/ML
15 SOLUTION OROPHARYNGEAL
Status: DISCONTINUED | OUTPATIENT
Start: 2023-06-16 | End: 2023-06-17 | Stop reason: HOSPADM

## 2023-06-16 RX ORDER — SENNA AND DOCUSATE SODIUM 50; 8.6 MG/1; MG/1
1 TABLET, FILM COATED ORAL 2 TIMES DAILY
Status: DISCONTINUED | OUTPATIENT
Start: 2023-06-16 | End: 2023-06-17 | Stop reason: HOSPADM

## 2023-06-16 RX ORDER — SODIUM CHLORIDE 0.9 % (FLUSH) 0.9 %
5-40 SYRINGE (ML) INJECTION EVERY 12 HOURS SCHEDULED
Status: DISCONTINUED | OUTPATIENT
Start: 2023-06-16 | End: 2023-06-17 | Stop reason: HOSPADM

## 2023-06-16 RX ORDER — AMLODIPINE BESYLATE 10 MG/1
10 TABLET ORAL DAILY
Status: DISCONTINUED | OUTPATIENT
Start: 2023-06-16 | End: 2023-06-17 | Stop reason: HOSPADM

## 2023-06-16 RX ORDER — POTASSIUM CHLORIDE 20 MEQ/1
40 TABLET, EXTENDED RELEASE ORAL PRN
Status: DISCONTINUED | OUTPATIENT
Start: 2023-06-16 | End: 2023-06-17 | Stop reason: HOSPADM

## 2023-06-16 RX ORDER — MINERAL OIL AND WHITE PETROLATUM 150; 830 MG/G; MG/G
OINTMENT OPHTHALMIC PRN
Status: DISCONTINUED | OUTPATIENT
Start: 2023-06-16 | End: 2023-06-17 | Stop reason: HOSPADM

## 2023-06-16 RX ORDER — ULTRASOUND COUPLING MEDIUM
GEL (GRAM) TOPICAL PRN
Status: DISCONTINUED | OUTPATIENT
Start: 2023-06-16 | End: 2023-06-16 | Stop reason: ALTCHOICE

## 2023-06-16 RX ORDER — SODIUM CHLORIDE 9 MG/ML
INJECTION, SOLUTION INTRAVENOUS PRN
Status: DISCONTINUED | OUTPATIENT
Start: 2023-06-16 | End: 2023-06-16 | Stop reason: HOSPADM

## 2023-06-16 RX ADMIN — DOCUSATE SODIUM 50 MG AND SENNOSIDES 8.6 MG 1 TABLET: 8.6; 5 TABLET, FILM COATED ORAL at 20:59

## 2023-06-16 RX ADMIN — HEPARIN SODIUM 5000 UNITS: 5000 INJECTION INTRAVENOUS; SUBCUTANEOUS at 07:12

## 2023-06-16 RX ADMIN — AMLODIPINE BESYLATE 10 MG: 10 TABLET ORAL at 14:49

## 2023-06-16 RX ADMIN — SODIUM CHLORIDE, POTASSIUM CHLORIDE, SODIUM LACTATE AND CALCIUM CHLORIDE 1000 ML: 600; 310; 30; 20 INJECTION, SOLUTION INTRAVENOUS at 07:00

## 2023-06-16 RX ADMIN — FENTANYL CITRATE 25 MCG: 50 INJECTION, SOLUTION INTRAMUSCULAR; INTRAVENOUS at 12:53

## 2023-06-16 RX ADMIN — FENTANYL CITRATE 25 MCG: 50 INJECTION, SOLUTION INTRAMUSCULAR; INTRAVENOUS at 12:47

## 2023-06-16 RX ADMIN — OXYBUTYNIN CHLORIDE 5 MG: 5 TABLET, EXTENDED RELEASE ORAL at 14:49

## 2023-06-16 RX ADMIN — POLYETHYLENE GLYCOL 3350 17 G: 17 POWDER, FOR SOLUTION ORAL at 14:54

## 2023-06-16 RX ADMIN — ROSUVASTATIN CALCIUM 40 MG: 20 TABLET, FILM COATED ORAL at 20:58

## 2023-06-16 RX ADMIN — Medication 2000 MG: at 14:58

## 2023-06-16 RX ADMIN — DOCUSATE SODIUM 50 MG AND SENNOSIDES 8.6 MG 1 TABLET: 8.6; 5 TABLET, FILM COATED ORAL at 14:49

## 2023-06-16 RX ADMIN — MORPHINE SULFATE 2 MG: 2 INJECTION, SOLUTION INTRAMUSCULAR; INTRAVENOUS at 12:12

## 2023-06-16 RX ADMIN — FENTANYL CITRATE 25 MCG: 50 INJECTION, SOLUTION INTRAMUSCULAR; INTRAVENOUS at 12:31

## 2023-06-16 RX ADMIN — OXYCODONE HYDROCHLORIDE 10 MG: 5 TABLET ORAL at 14:53

## 2023-06-16 RX ADMIN — ACETAMINOPHEN 650 MG: 325 TABLET ORAL at 20:59

## 2023-06-16 RX ADMIN — Medication 2000 MG: at 23:18

## 2023-06-16 RX ADMIN — OXYCODONE HYDROCHLORIDE 5 MG: 5 TABLET ORAL at 18:51

## 2023-06-16 RX ADMIN — SODIUM CHLORIDE: 9 INJECTION, SOLUTION INTRAVENOUS at 13:35

## 2023-06-16 RX ADMIN — FENTANYL CITRATE 25 MCG: 50 INJECTION, SOLUTION INTRAMUSCULAR; INTRAVENOUS at 13:29

## 2023-06-16 RX ADMIN — FENTANYL CITRATE 25 MCG: 50 INJECTION, SOLUTION INTRAMUSCULAR; INTRAVENOUS at 12:37

## 2023-06-16 RX ADMIN — MORPHINE SULFATE 2 MG: 2 INJECTION, SOLUTION INTRAMUSCULAR; INTRAVENOUS at 12:23

## 2023-06-16 RX ADMIN — ACETAMINOPHEN 650 MG: 325 TABLET ORAL at 14:48

## 2023-06-16 ASSESSMENT — PAIN DESCRIPTION - LOCATION
LOCATION: SCROTUM
LOCATION: ABDOMEN

## 2023-06-16 ASSESSMENT — PAIN SCALES - GENERAL
PAINLEVEL_OUTOF10: 3
PAINLEVEL_OUTOF10: 3
PAINLEVEL_OUTOF10: 4
PAINLEVEL_OUTOF10: 7

## 2023-06-16 ASSESSMENT — PAIN - FUNCTIONAL ASSESSMENT: PAIN_FUNCTIONAL_ASSESSMENT: 0-10

## 2023-06-16 ASSESSMENT — PAIN DESCRIPTION - DESCRIPTORS
DESCRIPTORS: PATIENT UNABLE TO DESCRIBE
DESCRIPTORS: ACHING;DISCOMFORT

## 2023-06-16 NOTE — OP NOTE
Operative Note      Patient: Asad Ash  YOB: 1950  MRN: [id number]    Date of Procedure: 6/16/2023    Pre-Op Diagnosis Codes:     * Prostate cancer ([facility] [address] Ashley Parada    Post-Op Diagnosis: Same       Procedure(s):  XI ROBOTIC LAPAROSCOPIC PROSTATECTOMY, POSSIBLE BILATERAL PELVIC LYMPHNODE DISSECTION    Surgeon(s):  Dotty Cook MD    Assistant:   First Assistant: Marietta Box RN  Resident: Abhishek Cook MD    Anesthesia: General    Estimated Blood Loss (mL): less than 217     Complications: None    Specimens:   ID Type Source Tests Collected by Time Destination   1 : URINE CUTURE FROM BUSTAMANTE INSERTION Urine Urine, indwelling catheter CULTURE, URINE Dotty Cook MD 6/16/2023 0827    A : Anterior Prostate Fat Tissue Prostate SURGICAL PATHOLOGY Dotty Cook MD 6/16/2023 0853    B : Prostate and seminal vesicles. Tissue Prostate SURGICAL PATHOLOGY Dotty Cook MD 6/16/2023 1122    C : Bilateral pelvic lymph nodes. Tissue Lymph Node SURGICAL PATHOLOGY Dotty Cook MD 6/16/2023 1104    D : Bladder Neck Margin Tissue Bladder SURGICAL PATHOLOGY Dotty Cook MD 6/16/2023 1029        Implants:  Implant Name Type Inv. Item Serial No.  Lot No. LRB No. Used Action   CLIP INT L POLYMER CUATE LIG HEM O CUATE - [id number]  CLIP INT L POLYMER CUATE LIG HEM O UCATE  [employer]  N/A 1 Implanted         Drains:   NG/OG/NJ/NE Tube Orogastric 18 fr Center mouth (Active)       Urinary Catheter 06/16/23 2 Way (Active)       [REMOVED] Urinary Catheter 06/16/23 2 Way (Removed)       Findings: see below         Detailed Description of Procedure: Indications: Asad Ash is a 71 y.o. male who was found to have unfavorable intermediate risk prostate cancer. Patient's PSMA PET CT was negative. After discussing the various Rx options, the patient elected to proceed with Robotic assisted laparoscopic radical prostatectomy and Pelvic Lymphadenectomy.     Operative summary:

## 2023-06-16 NOTE — PLAN OF CARE
Problem: Pain  Goal: Verbalizes/displays adequate comfort level or baseline comfort level  Outcome: Progressing  Flowsheets (Taken 6/16/2023 3954)  Verbalizes/displays adequate comfort level or baseline comfort level:   Administer analgesics based on type and severity of pain and evaluate response   Assess pain using appropriate pain scale   Encourage patient to monitor pain and request assistance   Implement non-pharmacological measures as appropriate and evaluate response

## 2023-06-17 VITALS
HEART RATE: 69 BPM | DIASTOLIC BLOOD PRESSURE: 64 MMHG | TEMPERATURE: 98.1 F | WEIGHT: 195 LBS | HEIGHT: 72 IN | SYSTOLIC BLOOD PRESSURE: 108 MMHG | OXYGEN SATURATION: 96 % | RESPIRATION RATE: 16 BRPM | BODY MASS INDEX: 26.41 KG/M2

## 2023-06-17 LAB
ANION GAP SERPL CALCULATED.3IONS-SCNC: 9 MMOL/L (ref 9–17)
BUN SERPL-MCNC: 17 MG/DL (ref 8–23)
CALCIUM SERPL-MCNC: 8.4 MG/DL (ref 8.6–10.4)
CHLORIDE SERPL-SCNC: 104 MMOL/L (ref 98–107)
CO2 SERPL-SCNC: 23 MMOL/L (ref 20–31)
CREAT SERPL-MCNC: 0.85 MG/DL (ref 0.7–1.2)
CREATININE FLUID: 0.9 MG/DL
CREATININE FLUID: 0.9 MG/DL
ERYTHROCYTE [DISTWIDTH] IN BLOOD BY AUTOMATED COUNT: 13.2 % (ref 11.8–14.4)
GFR SERPL CREATININE-BSD FRML MDRD: >60 ML/MIN/1.73M2
GLUCOSE SERPL-MCNC: 121 MG/DL (ref 70–99)
HCT VFR BLD AUTO: 38.4 % (ref 40.7–50.3)
HGB BLD-MCNC: 12.8 G/DL (ref 13–17)
MCH RBC QN AUTO: 31.4 PG (ref 25.2–33.5)
MCHC RBC AUTO-ENTMCNC: 33.3 G/DL (ref 28.4–34.8)
MCV RBC AUTO: 94.1 FL (ref 82.6–102.9)
MICROORGANISM SPEC CULT: NO GROWTH
NRBC AUTOMATED: 0 PER 100 WBC
PLATELET # BLD AUTO: 184 K/UL (ref 138–453)
PMV BLD AUTO: 10.5 FL (ref 8.1–13.5)
POTASSIUM SERPL-SCNC: 4.2 MMOL/L (ref 3.7–5.3)
RBC # BLD AUTO: 4.08 M/UL (ref 4.21–5.77)
SODIUM SERPL-SCNC: 136 MMOL/L (ref 135–144)
SPECIMEN DESCRIPTION: NORMAL
SPECIMEN TYPE: NORMAL
WBC OTHER # BLD: 14 K/UL (ref 3.5–11.3)

## 2023-06-17 PROCEDURE — 6370000000 HC RX 637 (ALT 250 FOR IP): Performed by: STUDENT IN AN ORGANIZED HEALTH CARE EDUCATION/TRAINING PROGRAM

## 2023-06-17 PROCEDURE — 80048 BASIC METABOLIC PNL TOTAL CA: CPT

## 2023-06-17 PROCEDURE — 85027 COMPLETE CBC AUTOMATED: CPT

## 2023-06-17 PROCEDURE — 6360000002 HC RX W HCPCS: Performed by: STUDENT IN AN ORGANIZED HEALTH CARE EDUCATION/TRAINING PROGRAM

## 2023-06-17 PROCEDURE — 36415 COLL VENOUS BLD VENIPUNCTURE: CPT

## 2023-06-17 PROCEDURE — 82570 ASSAY OF URINE CREATININE: CPT

## 2023-06-17 PROCEDURE — G0378 HOSPITAL OBSERVATION PER HR: HCPCS

## 2023-06-17 RX ORDER — HYOSCYAMINE SULFATE 0.12 MG/1
0.12 TABLET SUBLINGUAL EVERY 4 HOURS PRN
Qty: 42 EACH | Refills: 0 | Status: SHIPPED | OUTPATIENT
Start: 2023-06-17 | End: 2023-06-24

## 2023-06-17 RX ORDER — CEFADROXIL 500 MG/1
500 CAPSULE ORAL 2 TIMES DAILY
Qty: 6 CAPSULE | Refills: 0 | Status: SHIPPED | OUTPATIENT
Start: 2023-06-17 | End: 2023-06-20

## 2023-06-17 RX ORDER — OXYCODONE HYDROCHLORIDE 5 MG/1
5 TABLET ORAL EVERY 6 HOURS PRN
Qty: 20 TABLET | Refills: 0 | Status: SHIPPED | OUTPATIENT
Start: 2023-06-17 | End: 2023-06-24

## 2023-06-17 RX ORDER — DOCUSATE SODIUM 100 MG/1
100 CAPSULE, LIQUID FILLED ORAL 2 TIMES DAILY
Qty: 60 CAPSULE | Refills: 0 | Status: SHIPPED | OUTPATIENT
Start: 2023-06-17 | End: 2023-07-17

## 2023-06-17 RX ADMIN — ACETAMINOPHEN 650 MG: 325 TABLET ORAL at 06:11

## 2023-06-17 RX ADMIN — ACETAMINOPHEN 650 MG: 325 TABLET ORAL at 08:45

## 2023-06-17 RX ADMIN — Medication 2000 MG: at 06:11

## 2023-06-17 RX ADMIN — OXYCODONE HYDROCHLORIDE 5 MG: 5 TABLET ORAL at 06:16

## 2023-06-17 RX ADMIN — POLYETHYLENE GLYCOL 3350 17 G: 17 POWDER, FOR SOLUTION ORAL at 08:45

## 2023-06-17 RX ADMIN — AMLODIPINE BESYLATE 10 MG: 10 TABLET ORAL at 08:45

## 2023-06-17 RX ADMIN — DOCUSATE SODIUM 50 MG AND SENNOSIDES 8.6 MG 1 TABLET: 8.6; 5 TABLET, FILM COATED ORAL at 08:45

## 2023-06-17 ASSESSMENT — PAIN DESCRIPTION - DESCRIPTORS: DESCRIPTORS: DISCOMFORT

## 2023-06-17 ASSESSMENT — PAIN SCALES - GENERAL
PAINLEVEL_OUTOF10: 2
PAINLEVEL_OUTOF10: 3
PAINLEVEL_OUTOF10: 6

## 2023-06-17 ASSESSMENT — PAIN DESCRIPTION - LOCATION: LOCATION: ABDOMEN

## 2023-06-17 ASSESSMENT — PAIN DESCRIPTION - ORIENTATION: ORIENTATION: ANTERIOR

## 2023-06-17 NOTE — PLAN OF CARE
Problem: Discharge Planning  Goal: Discharge to home or other facility with appropriate resources  Outcome: Progressing     Problem: Pain  Goal: Verbalizes/displays adequate comfort level or baseline comfort level  6/17/2023 0343 by Eduar Velasquez RN  Outcome: Progressing    Problem: Safety - Adult  Goal: Free from fall injury  Outcome: Progressing

## 2023-06-17 NOTE — PROGRESS NOTES
Siri Alamo MD FACS    Urology Progress Note    Subjective:   AFVSS  No acute events overnight  Ambulating  Passing gas  Tolerating diet  Pain controlled  UOP 700cc/24hours  YOLA drain 130cc/24 hours    Patient Vitals for the past 24 hrs:   BP Temp Temp src Pulse Resp SpO2   06/17/23 0815 121/74 98 °F (36.7 °C) Oral 56 16 97 %   06/17/23 0300 (!) 110/58 98.8 °F (37.1 °C) Oral 65 16 97 %   06/16/23 2045 117/67 99 °F (37.2 °C) Oral 81 16 97 %   06/16/23 1921 -- -- -- -- 14 --   06/16/23 1631 129/70 98 °F (36.7 °C) Oral 58 16 98 %   06/16/23 1411 (!) 146/69 98.2 °F (36.8 °C) Oral 76 18 95 %   06/16/23 1345 129/71 97.7 °F (36.5 °C) Temporal 76 12 100 %   06/16/23 1330 97/70 -- -- 83 15 99 %   06/16/23 1315 120/64 -- -- 76 12 95 %   06/16/23 1308 -- -- -- 84 15 --   06/16/23 1305 -- -- -- 82 -- --   06/16/23 1300 128/62 -- -- 74 19 --   06/16/23 1245 122/60 -- -- 75 12 96 %   06/16/23 1231 111/64 -- -- 75 12 100 %   06/16/23 1215 128/70 -- -- 72 11 95 %   06/16/23 1200 (!) 125/55 -- -- 79 14 97 %   06/16/23 1158 (!) 140/85 97.7 °F (36.5 °C) Temporal 78 12 96 %       Intake/Output Summary (Last 24 hours) at 6/17/2023 0818  Last data filed at 6/16/2023 2045  Gross per 24 hour   Intake 3060 ml   Output 880 ml   Net 2180 ml       Recent Labs     06/16/23  1354 06/17/23  0629   WBC 11.9* 14.0*   HGB 14.0 12.8*   HCT 41.0 38.4*   MCV 91.9 94.1    184     Recent Labs     06/16/23  1354 06/17/23  0629   * 136   K 3.6* 4.2   CL 99 104   CO2 23 23   BUN 21 17   CREATININE 0.97 0.85       No results for input(s): COLORU, PHUR, LABCAST, WBCUA, RBCUA, MUCUS, TRICHOMONAS, YEAST, BACTERIA, CLARITYU, SPECGRAV, LEUKOCYTESUR, UROBILINOGEN, BILIRUBINUR, BLOODU in the last 72 hours.     Invalid input(s): NITRATE, GLUCOSEUKETONESUAMORPHOUS    Additional Lab/culture results:    Physical Exam:   General: A/O x 3, no acute distress  Head: atraumatic, normocephalic   HEENT: moist membranes, PERRLA, EOMI  Respiratory: normal

## 2023-06-17 NOTE — DISCHARGE INSTRUCTIONS
PROSTATECTOMY DISCHARGE INSTRUCTIONS   Patient ok to discharge home in good condition  No heavy lifting, >10 lbs for 6-8 weeks  Patient should walk moderately at home  Patient ok to shower   Patient may resume diet as tolerated  Patient should take prescriptions as directed  No driving while on narcotics (tramadol, norco, percocet, oxycodone)  Please call attending physician or hospital  with questions  Call or Present to ED if fever (> 101F), intractable nausea,vomiting, or pain.   Prescriptions in chart    Patient should follow up with Dr. Pratima Daniels, in 1 weeks for catheter removal, call to confirm appointment

## 2023-06-19 ENCOUNTER — TELEPHONE (OUTPATIENT)
Age: 73
End: 2023-06-19

## 2023-06-19 DIAGNOSIS — Z12.5 ENCOUNTER FOR SCREENING FOR MALIGNANT NEOPLASM OF PROSTATE: ICD-10-CM

## 2023-06-19 DIAGNOSIS — C61 PROSTATE CANCER (HCC): Primary | ICD-10-CM

## 2023-06-19 DIAGNOSIS — R97.20 ELEVATED PSA: ICD-10-CM

## 2023-06-20 LAB — SURGICAL PATHOLOGY REPORT: NORMAL

## 2023-06-23 ENCOUNTER — HOSPITAL ENCOUNTER (OUTPATIENT)
Dept: GENERAL RADIOLOGY | Age: 73
End: 2023-06-23
Attending: SPECIALIST
Payer: MEDICARE

## 2023-06-23 DIAGNOSIS — C61 PROSTATE CANCER (HCC): ICD-10-CM

## 2023-06-23 PROCEDURE — 6360000004 HC RX CONTRAST MEDICATION: Performed by: SPECIALIST

## 2023-06-23 PROCEDURE — 74430 CONTRAST X-RAY BLADDER: CPT

## 2023-06-23 RX ADMIN — DIATRIZOATE MEGLUMINE 200 ML: 300 INJECTION, SOLUTION INTRAVENOUS at 10:09

## 2023-07-12 ENCOUNTER — TELEPHONE (OUTPATIENT)
Age: 73
End: 2023-07-12

## 2023-07-12 NOTE — TELEPHONE ENCOUNTER
I sent them a my chart notification several weeks ago. Your final Robotic assisted laparoscopic radical prostatectomy pathology looks good. No extension of cancer outside prostate and the surgical margins were negative with NO spread to lymph nodes. Excellent result!

## 2023-07-18 LAB — PROSTATE SPECIFIC ANTIGEN: <0.06 NG/ML

## 2023-07-24 ENCOUNTER — OFFICE VISIT (OUTPATIENT)
Age: 73
End: 2023-07-24
Payer: MEDICARE

## 2023-07-24 VITALS — WEIGHT: 195 LBS | BODY MASS INDEX: 27.3 KG/M2 | HEIGHT: 71 IN

## 2023-07-24 DIAGNOSIS — N39.3 MALE STRESS INCONTINENCE: ICD-10-CM

## 2023-07-24 DIAGNOSIS — N52.8 OTHER MALE ERECTILE DYSFUNCTION: Primary | ICD-10-CM

## 2023-07-24 DIAGNOSIS — Z85.46 HISTORY OF PROSTATE CANCER: ICD-10-CM

## 2023-07-24 PROBLEM — R97.20 ELEVATED PSA: Status: RESOLVED | Noted: 2017-12-20 | Resolved: 2023-07-24

## 2023-07-24 PROBLEM — N40.1 BPH WITH OBSTRUCTION/LOWER URINARY TRACT SYMPTOMS: Status: RESOLVED | Noted: 2017-12-20 | Resolved: 2023-07-24

## 2023-07-24 PROBLEM — N13.8 BPH WITH OBSTRUCTION/LOWER URINARY TRACT SYMPTOMS: Status: RESOLVED | Noted: 2017-12-20 | Resolved: 2023-07-24

## 2023-07-24 LAB
BILIRUBIN, POC: NORMAL
BLOOD URINE, POC: NORMAL
CLARITY, POC: CLEAR
COLOR, POC: YELLOW
GLUCOSE URINE, POC: NORMAL
KETONES, POC: NORMAL
LEUKOCYTE EST, POC: NORMAL
NITRITE, POC: NORMAL
PH, POC: NORMAL
PROTEIN, POC: NORMAL
SPECIFIC GRAVITY, POC: NORMAL
UROBILINOGEN, POC: NORMAL

## 2023-07-24 PROCEDURE — 99024 POSTOP FOLLOW-UP VISIT: CPT | Performed by: SPECIALIST

## 2023-07-24 PROCEDURE — 81003 URINALYSIS AUTO W/O SCOPE: CPT | Performed by: SPECIALIST

## 2023-07-24 RX ORDER — TADALAFIL 5 MG/1
5 TABLET ORAL DAILY PRN
Qty: 30 TABLET | Refills: 5 | Status: SHIPPED | OUTPATIENT
Start: 2023-07-24

## 2023-07-24 NOTE — PROGRESS NOTES
Pama Nageotte Linda Eisenmenger, MD [facility]  7/24/2023 1:46 PM    2023 Quality Measure Documentation: N/A  Social History     Tobacco Use   Smoking Status Some Days    Types: Cigars   Smokeless Tobacco Never     (If patient a smoker, smoking cessation counseling offered)

## 2023-08-14 ENCOUNTER — TELEPHONE (OUTPATIENT)
Age: 73
End: 2023-08-14

## 2023-08-14 ENCOUNTER — APPOINTMENT (OUTPATIENT)
Dept: GENERAL RADIOLOGY | Age: 73
End: 2023-08-14
Payer: MEDICARE

## 2023-08-14 ENCOUNTER — HOSPITAL ENCOUNTER (EMERGENCY)
Age: 73
Discharge: HOME | End: 2023-08-14
Attending: EMERGENCY MEDICINE
Payer: MEDICARE

## 2023-08-14 VITALS
RESPIRATION RATE: 16 BRPM | HEART RATE: 71 BPM | DIASTOLIC BLOOD PRESSURE: 64 MMHG | SYSTOLIC BLOOD PRESSURE: 101 MMHG | TEMPERATURE: 100.2 F | OXYGEN SATURATION: 96 %

## 2023-08-14 DIAGNOSIS — B34.9 VIRAL ILLNESS: Primary | ICD-10-CM

## 2023-08-14 LAB
ALBUMIN SERPL-MCNC: 4.1 G/DL (ref 3.5–5.2)
ALBUMIN/GLOB SERPL: 1.5 {RATIO} (ref 1–2.5)
ALP SERPL-CCNC: 58 U/L (ref 40–129)
ALT SERPL-CCNC: 13 U/L (ref 5–41)
ANION GAP SERPL CALCULATED.3IONS-SCNC: 11 MMOL/L (ref 9–17)
AST SERPL-CCNC: 16 U/L
BASOPHILS # BLD: 0.12 K/UL (ref 0–0.2)
BASOPHILS NFR BLD: 1 % (ref 0–2)
BILIRUB SERPL-MCNC: 0.9 MG/DL (ref 0.3–1.2)
BILIRUB UR QL STRIP: NEGATIVE
BUN SERPL-MCNC: 14 MG/DL (ref 8–23)
CALCIUM SERPL-MCNC: 9.6 MG/DL (ref 8.6–10.4)
CASTS #/AREA URNS LPF: NORMAL /LPF (ref 0–8)
CHLORIDE SERPL-SCNC: 99 MMOL/L (ref 98–107)
CK SERPL-CCNC: 88 U/L (ref 39–308)
CLARITY UR: CLEAR
CO2 SERPL-SCNC: 24 MMOL/L (ref 20–31)
COLOR UR: YELLOW
CREAT SERPL-MCNC: 1.1 MG/DL (ref 0.7–1.2)
EOSINOPHIL # BLD: 0 K/UL (ref 0–0.4)
EOSINOPHILS RELATIVE PERCENT: 0 % (ref 1–4)
EPI CELLS #/AREA URNS HPF: NORMAL /HPF (ref 0–5)
ERYTHROCYTE [DISTWIDTH] IN BLOOD BY AUTOMATED COUNT: 13.1 % (ref 11.8–14.4)
FLUAV AG SPEC QL: NEGATIVE
FLUBV AG SPEC QL: NEGATIVE
GFR SERPL CREATININE-BSD FRML MDRD: >60 ML/MIN/1.73M2
GLUCOSE SERPL-MCNC: 118 MG/DL (ref 70–99)
GLUCOSE UR STRIP-MCNC: NEGATIVE MG/DL
HCT VFR BLD AUTO: 42.2 % (ref 40.7–50.3)
HGB BLD-MCNC: 14.3 G/DL (ref 13–17)
HGB UR QL STRIP.AUTO: NEGATIVE
IMM GRANULOCYTES # BLD AUTO: 0 K/UL (ref 0–0.3)
IMM GRANULOCYTES NFR BLD: 0 %
KETONES UR STRIP-MCNC: NEGATIVE MG/DL
LACTIC ACID, WHOLE BLOOD: 1.6 MMOL/L (ref 0.7–2.1)
LEUKOCYTE ESTERASE UR QL STRIP: ABNORMAL
LYMPHOCYTES NFR BLD: 0.24 K/UL (ref 1–4.8)
LYMPHOCYTES RELATIVE PERCENT: 2 % (ref 24–44)
MCH RBC QN AUTO: 31.3 PG (ref 25.2–33.5)
MCHC RBC AUTO-ENTMCNC: 33.9 G/DL (ref 28.4–34.8)
MCV RBC AUTO: 92.3 FL (ref 82.6–102.9)
MONOCYTES NFR BLD: 1.08 K/UL (ref 0.1–0.8)
MONOCYTES NFR BLD: 9 % (ref 1–7)
MORPHOLOGY: NORMAL
MYOGLOBIN SERPL-MCNC: 49 NG/ML (ref 28–72)
NEUTROPHILS NFR BLD: 88 % (ref 36–66)
NEUTS SEG NFR BLD: 10.56 K/UL (ref 1.8–7.7)
NITRITE UR QL STRIP: NEGATIVE
NRBC BLD-RTO: 0 PER 100 WBC
PH UR STRIP: 8.5 [PH] (ref 5–8)
PLATELET # BLD AUTO: 176 K/UL (ref 138–453)
PMV BLD AUTO: 10.1 FL (ref 8.1–13.5)
POTASSIUM SERPL-SCNC: 4 MMOL/L (ref 3.7–5.3)
PROT SERPL-MCNC: 6.9 G/DL (ref 6.4–8.3)
PROT UR STRIP-MCNC: NEGATIVE MG/DL
RBC # BLD AUTO: 4.57 M/UL (ref 4.21–5.77)
RBC #/AREA URNS HPF: NORMAL /HPF (ref 0–4)
SARS-COV-2 RDRP RESP QL NAA+PROBE: NOT DETECTED
SODIUM SERPL-SCNC: 134 MMOL/L (ref 135–144)
SP GR UR STRIP: 1.02 (ref 1–1.03)
SPECIMEN DESCRIPTION: NORMAL
UROBILINOGEN UR STRIP-ACNC: NORMAL EU/DL (ref 0–1)
WBC #/AREA URNS HPF: NORMAL /HPF (ref 0–5)
WBC OTHER # BLD: 12 K/UL (ref 3.5–11.3)

## 2023-08-14 PROCEDURE — 83605 ASSAY OF LACTIC ACID: CPT

## 2023-08-14 PROCEDURE — 80053 COMPREHEN METABOLIC PANEL: CPT

## 2023-08-14 PROCEDURE — 83874 ASSAY OF MYOGLOBIN: CPT

## 2023-08-14 PROCEDURE — 85025 COMPLETE CBC W/AUTO DIFF WBC: CPT

## 2023-08-14 PROCEDURE — 96360 HYDRATION IV INFUSION INIT: CPT

## 2023-08-14 PROCEDURE — 87804 INFLUENZA ASSAY W/OPTIC: CPT

## 2023-08-14 PROCEDURE — 87040 BLOOD CULTURE FOR BACTERIA: CPT

## 2023-08-14 PROCEDURE — 6370000000 HC RX 637 (ALT 250 FOR IP)

## 2023-08-14 PROCEDURE — 71046 X-RAY EXAM CHEST 2 VIEWS: CPT

## 2023-08-14 PROCEDURE — 99285 EMERGENCY DEPT VISIT HI MDM: CPT

## 2023-08-14 PROCEDURE — 82550 ASSAY OF CK (CPK): CPT

## 2023-08-14 PROCEDURE — 87086 URINE CULTURE/COLONY COUNT: CPT

## 2023-08-14 PROCEDURE — 93005 ELECTROCARDIOGRAM TRACING: CPT

## 2023-08-14 PROCEDURE — 87635 SARS-COV-2 COVID-19 AMP PRB: CPT

## 2023-08-14 PROCEDURE — 2580000003 HC RX 258

## 2023-08-14 PROCEDURE — 81001 URINALYSIS AUTO W/SCOPE: CPT

## 2023-08-14 RX ORDER — 0.9 % SODIUM CHLORIDE 0.9 %
1000 INTRAVENOUS SOLUTION INTRAVENOUS ONCE
Status: COMPLETED | OUTPATIENT
Start: 2023-08-14 | End: 2023-08-14

## 2023-08-14 RX ORDER — ACETAMINOPHEN 500 MG
1000 TABLET ORAL
Status: COMPLETED | OUTPATIENT
Start: 2023-08-14 | End: 2023-08-14

## 2023-08-14 RX ADMIN — SODIUM CHLORIDE 1000 ML: 9 INJECTION, SOLUTION INTRAVENOUS at 19:40

## 2023-08-14 RX ADMIN — ACETAMINOPHEN 1000 MG: 500 TABLET ORAL at 18:42

## 2023-08-14 ASSESSMENT — ENCOUNTER SYMPTOMS
EYE PAIN: 0
ABDOMINAL PAIN: 0
NAUSEA: 0
RHINORRHEA: 0
BACK PAIN: 0
SHORTNESS OF BREATH: 0
SORE THROAT: 0
EYE REDNESS: 0
VOMITING: 0
COUGH: 0
DIARRHEA: 0

## 2023-08-14 ASSESSMENT — PAIN - FUNCTIONAL ASSESSMENT: PAIN_FUNCTIONAL_ASSESSMENT: 0-10

## 2023-08-14 ASSESSMENT — PAIN SCALES - GENERAL: PAINLEVEL_OUTOF10: 7

## 2023-08-14 ASSESSMENT — PAIN DESCRIPTION - LOCATION: LOCATION: GENERALIZED

## 2023-08-14 NOTE — TELEPHONE ENCOUNTER
Patient's wife called to say she thinks patient might be having a reaction to C5 because he has a fever of 103 and he has chills and body aches. Per [address] [place] [address], I strongly recommended that patient go to ER because C5 would not cause a fever. Patient's wife said she would take him to [facility].

## 2023-08-14 NOTE — ED NOTES
Pt to ED for fever, chills, and generalized body aches x1 day. Pt reports taking tylenol this morning for fever of 102 at home. Pt temp on arrival 100.2. Denies chest pain or SOB. No abdominal pain or nausea. Hx of hypertension. Patient alert and oriented x4, talking in complete sentences. Respirations even and unlabored. EKG obtained, IV established.  Call light in reach, all needs met at this time     Marcos Erazo RN  08/14/23 5158

## 2023-08-15 LAB
EKG ATRIAL RATE: 66 BPM
EKG P AXIS: 40 DEGREES
EKG P-R INTERVAL: 150 MS
EKG Q-T INTERVAL: 380 MS
EKG QRS DURATION: 100 MS
EKG QTC CALCULATION (BAZETT): 398 MS
EKG R AXIS: -4 DEGREES
EKG T AXIS: 4 DEGREES
EKG VENTRICULAR RATE: 66 BPM
MICROORGANISM SPEC CULT: NORMAL
SPECIMEN DESCRIPTION: NORMAL

## 2023-08-15 PROCEDURE — 93010 ELECTROCARDIOGRAM REPORT: CPT | Performed by: INTERNAL MEDICINE

## 2023-08-15 NOTE — DISCHARGE INSTRUCTIONS
Call today or tomorrow to follow up with Lesia Grewal, DO  in 2-7 days. Drink plenty of water, gatorade, juice. Avoid drinking alcohol. Use ibuprofen or Tylenol (unless prescribed medications that have Tylenol in it) for pain. You can take over the counter Ibuprofen (advil) tablets (4 every 8 hours or 3 every 6 hours or 2 every 4 hours)    Return to the Emergency Department for worsening of cough, fever > 101.5 and not controlled with Tylenol or Ibuprofen, excessive nausea or vomiting, worsening of nasal discharge, any other care or concern.

## 2023-08-18 ENCOUNTER — TELEPHONE (OUTPATIENT)
Age: 73
End: 2023-08-18

## 2023-08-18 NOTE — TELEPHONE ENCOUNTER
PT CALLED TO SAY THAT SINCE HE HAD HIS PROSTATECTOMY ABOUT 2 MONTHS AGO HE IS REALLY TIRED ALL THE TIME AND HAS NO APPETITE, HE ALSO HAS SOME DISCOMFORT AROUND HIS BLADDER- HE [facility] TO [facility] Monday AFTER CALLING OUR OFFICE WITH A FEVER - HE WONDERS IF HE SHOULD SEE YOU FOR THESE SYMPTOMS OR FOLLOW UP WITH HIS PCP-PLEASE [facility]ISE-SIL[facility]

## 2023-08-19 LAB
MICROORGANISM SPEC CULT: NORMAL
MICROORGANISM SPEC CULT: NORMAL
SERVICE CMNT-IMP: NORMAL
SERVICE CMNT-IMP: NORMAL
SPECIMEN DESCRIPTION: NORMAL
SPECIMEN DESCRIPTION: NORMAL

## 2023-08-20 ENCOUNTER — HOSPITAL ENCOUNTER (INPATIENT)
Age: 73
LOS: 3 days | Discharge: HOME | DRG: 862 | End: 2023-08-23
Attending: EMERGENCY MEDICINE | Admitting: INTERNAL MEDICINE
Payer: MEDICARE

## 2023-08-20 ENCOUNTER — APPOINTMENT (OUTPATIENT)
Dept: CT IMAGING | Age: 73
DRG: 862 | End: 2023-08-20
Payer: MEDICARE

## 2023-08-20 DIAGNOSIS — L02.91 ABSCESS: Primary | ICD-10-CM

## 2023-08-20 PROBLEM — K65.1 POSTOPERATIVE INTRA-ABDOMINAL ABSCESS (HCC): Status: ACTIVE | Noted: 2023-08-20

## 2023-08-20 PROBLEM — T81.43XA POSTOPERATIVE INTRA-ABDOMINAL ABSCESS: Status: ACTIVE | Noted: 2023-08-20

## 2023-08-20 PROBLEM — K65.1 INTRA-ABDOMINAL ABSCESS (HCC): Status: ACTIVE | Noted: 2023-08-20

## 2023-08-20 PROBLEM — I10 HYPERTENSION: Status: ACTIVE | Noted: 2023-08-20

## 2023-08-20 LAB
ALBUMIN SERPL-MCNC: 2.4 G/DL (ref 3.5–5.2)
ALBUMIN/GLOB SERPL: 0.6 {RATIO} (ref 1–2.5)
ALP SERPL-CCNC: 67 U/L (ref 40–129)
ALT SERPL-CCNC: 34 U/L (ref 5–41)
ANION GAP SERPL CALCULATED.3IONS-SCNC: 12 MMOL/L (ref 9–17)
AST SERPL-CCNC: 23 U/L
BASOPHILS # BLD: 0 K/UL (ref 0–0.2)
BASOPHILS NFR BLD: 0 % (ref 0–2)
BILIRUB DIRECT SERPL-MCNC: 0.2 MG/DL
BILIRUB INDIRECT SERPL-MCNC: 0.2 MG/DL (ref 0–1)
BILIRUB SERPL-MCNC: 0.4 MG/DL (ref 0.3–1.2)
BILIRUB UR QL STRIP: NEGATIVE
BUN SERPL-MCNC: 14 MG/DL (ref 8–23)
CALCIUM SERPL-MCNC: 9.6 MG/DL (ref 8.6–10.4)
CHLORIDE SERPL-SCNC: 100 MMOL/L (ref 98–107)
CLARITY UR: CLEAR
CO2 SERPL-SCNC: 24 MMOL/L (ref 20–31)
COLOR UR: YELLOW
COMMENT: ABNORMAL
CREAT SERPL-MCNC: 1.2 MG/DL (ref 0.7–1.2)
EOSINOPHIL # BLD: 0 K/UL (ref 0–0.44)
EOSINOPHILS RELATIVE PERCENT: 0 % (ref 1–4)
ERYTHROCYTE [DISTWIDTH] IN BLOOD BY AUTOMATED COUNT: 13.2 % (ref 11.8–14.4)
GFR SERPL CREATININE-BSD FRML MDRD: >60 ML/MIN/1.73M2
GLUCOSE SERPL-MCNC: 129 MG/DL (ref 70–99)
GLUCOSE UR STRIP-MCNC: NEGATIVE MG/DL
HCT VFR BLD AUTO: 38.4 % (ref 40.7–50.3)
HGB BLD-MCNC: 12.6 G/DL (ref 13–17)
HGB UR QL STRIP.AUTO: NEGATIVE
IMM GRANULOCYTES # BLD AUTO: 0 K/UL (ref 0–0.3)
IMM GRANULOCYTES NFR BLD: 0 %
INR PPP: 1.2
KETONES UR STRIP-MCNC: NEGATIVE MG/DL
LACTIC ACID, WHOLE BLOOD: 1 MMOL/L (ref 0.7–2.1)
LEUKOCYTE ESTERASE UR QL STRIP: NEGATIVE
LYMPHOCYTES NFR BLD: 0.56 K/UL (ref 1.1–3.7)
LYMPHOCYTES RELATIVE PERCENT: 5 % (ref 24–43)
MAGNESIUM SERPL-MCNC: 2.4 MG/DL (ref 1.6–2.6)
MCH RBC QN AUTO: 31.1 PG (ref 25.2–33.5)
MCHC RBC AUTO-ENTMCNC: 32.8 G/DL (ref 28.4–34.8)
MCV RBC AUTO: 94.8 FL (ref 82.6–102.9)
MONOCYTES NFR BLD: 1.01 K/UL (ref 0.1–1.2)
MONOCYTES NFR BLD: 9 % (ref 3–12)
MORPHOLOGY: NORMAL
NEUTROPHILS NFR BLD: 86 % (ref 36–65)
NEUTS SEG NFR BLD: 9.63 K/UL (ref 1.5–8.1)
NITRITE UR QL STRIP: NEGATIVE
NRBC BLD-RTO: 0 PER 100 WBC
PH UR STRIP: 6 [PH] (ref 5–8)
PLATELET # BLD AUTO: 255 K/UL (ref 138–453)
PMV BLD AUTO: 10.5 FL (ref 8.1–13.5)
POTASSIUM SERPL-SCNC: 4.1 MMOL/L (ref 3.7–5.3)
PROT SERPL-MCNC: 6.3 G/DL (ref 6.4–8.3)
PROT UR STRIP-MCNC: NEGATIVE MG/DL
PROTHROMBIN TIME: 15.1 SEC (ref 11.7–14.9)
PSA SERPL-MCNC: <0.02 NG/ML
RBC # BLD AUTO: 4.05 M/UL (ref 4.21–5.77)
SODIUM SERPL-SCNC: 136 MMOL/L (ref 135–144)
SP GR UR STRIP: 1.08 (ref 1–1.03)
TROPONIN I SERPL HS-MCNC: 11 NG/L (ref 0–22)
TROPONIN I SERPL HS-MCNC: 12 NG/L (ref 0–22)
UROBILINOGEN UR STRIP-ACNC: NORMAL EU/DL (ref 0–1)
WBC OTHER # BLD: 11.2 K/UL (ref 3.5–11.3)

## 2023-08-20 PROCEDURE — 83735 ASSAY OF MAGNESIUM: CPT

## 2023-08-20 PROCEDURE — 99223 1ST HOSP IP/OBS HIGH 75: CPT | Performed by: INTERNAL MEDICINE

## 2023-08-20 PROCEDURE — 99285 EMERGENCY DEPT VISIT HI MDM: CPT

## 2023-08-20 PROCEDURE — 71260 CT THORAX DX C+: CPT

## 2023-08-20 PROCEDURE — 6360000002 HC RX W HCPCS

## 2023-08-20 PROCEDURE — 6360000002 HC RX W HCPCS: Performed by: EMERGENCY MEDICINE

## 2023-08-20 PROCEDURE — 74177 CT ABD & PELVIS W/CONTRAST: CPT

## 2023-08-20 PROCEDURE — 83605 ASSAY OF LACTIC ACID: CPT

## 2023-08-20 PROCEDURE — 6360000004 HC RX CONTRAST MEDICATION: Performed by: EMERGENCY MEDICINE

## 2023-08-20 PROCEDURE — 2580000003 HC RX 258: Performed by: EMERGENCY MEDICINE

## 2023-08-20 PROCEDURE — 85610 PROTHROMBIN TIME: CPT

## 2023-08-20 PROCEDURE — 6370000000 HC RX 637 (ALT 250 FOR IP)

## 2023-08-20 PROCEDURE — 80048 BASIC METABOLIC PNL TOTAL CA: CPT

## 2023-08-20 PROCEDURE — 96374 THER/PROPH/DIAG INJ IV PUSH: CPT

## 2023-08-20 PROCEDURE — 84484 ASSAY OF TROPONIN QUANT: CPT

## 2023-08-20 PROCEDURE — 85025 COMPLETE CBC W/AUTO DIFF WBC: CPT

## 2023-08-20 PROCEDURE — 80076 HEPATIC FUNCTION PANEL: CPT

## 2023-08-20 PROCEDURE — G0103 PSA SCREENING: HCPCS

## 2023-08-20 PROCEDURE — 93005 ELECTROCARDIOGRAM TRACING: CPT | Performed by: EMERGENCY MEDICINE

## 2023-08-20 PROCEDURE — 81003 URINALYSIS AUTO W/O SCOPE: CPT

## 2023-08-20 PROCEDURE — 2580000003 HC RX 258

## 2023-08-20 PROCEDURE — 1200000000 HC SEMI PRIVATE

## 2023-08-20 PROCEDURE — 36415 COLL VENOUS BLD VENIPUNCTURE: CPT

## 2023-08-20 RX ORDER — SODIUM CHLORIDE 0.9 % (FLUSH) 0.9 %
5-40 SYRINGE (ML) INJECTION EVERY 12 HOURS SCHEDULED
Status: DISCONTINUED | OUTPATIENT
Start: 2023-08-20 | End: 2023-08-23 | Stop reason: HOSPADM

## 2023-08-20 RX ORDER — 0.9 % SODIUM CHLORIDE 0.9 %
1000 INTRAVENOUS SOLUTION INTRAVENOUS ONCE
Status: COMPLETED | OUTPATIENT
Start: 2023-08-20 | End: 2023-08-20

## 2023-08-20 RX ORDER — SODIUM CHLORIDE 9 MG/ML
INJECTION, SOLUTION INTRAVENOUS PRN
Status: DISCONTINUED | OUTPATIENT
Start: 2023-08-20 | End: 2023-08-23 | Stop reason: HOSPADM

## 2023-08-20 RX ORDER — SODIUM CHLORIDE 9 MG/ML
INJECTION, SOLUTION INTRAVENOUS CONTINUOUS
Status: DISCONTINUED | OUTPATIENT
Start: 2023-08-20 | End: 2023-08-23 | Stop reason: HOSPADM

## 2023-08-20 RX ORDER — ONDANSETRON 4 MG/1
4 TABLET, ORALLY DISINTEGRATING ORAL EVERY 8 HOURS PRN
Status: DISCONTINUED | OUTPATIENT
Start: 2023-08-20 | End: 2023-08-23 | Stop reason: HOSPADM

## 2023-08-20 RX ORDER — ENOXAPARIN SODIUM 100 MG/ML
40 INJECTION SUBCUTANEOUS DAILY
Status: DISCONTINUED | OUTPATIENT
Start: 2023-08-20 | End: 2023-08-23 | Stop reason: HOSPADM

## 2023-08-20 RX ORDER — SODIUM CHLORIDE 0.9 % (FLUSH) 0.9 %
5-40 SYRINGE (ML) INJECTION PRN
Status: DISCONTINUED | OUTPATIENT
Start: 2023-08-20 | End: 2023-08-23 | Stop reason: HOSPADM

## 2023-08-20 RX ORDER — POLYETHYLENE GLYCOL 3350 17 G/17G
17 POWDER, FOR SOLUTION ORAL DAILY PRN
Status: DISCONTINUED | OUTPATIENT
Start: 2023-08-20 | End: 2023-08-23 | Stop reason: HOSPADM

## 2023-08-20 RX ORDER — ACETAMINOPHEN 325 MG/1
650 TABLET ORAL EVERY 6 HOURS PRN
Status: DISCONTINUED | OUTPATIENT
Start: 2023-08-20 | End: 2023-08-23 | Stop reason: HOSPADM

## 2023-08-20 RX ORDER — ACETAMINOPHEN 650 MG/1
650 SUPPOSITORY RECTAL EVERY 6 HOURS PRN
Status: DISCONTINUED | OUTPATIENT
Start: 2023-08-20 | End: 2023-08-23 | Stop reason: HOSPADM

## 2023-08-20 RX ORDER — ROSUVASTATIN CALCIUM 20 MG/1
40 TABLET, COATED ORAL EVERY EVENING
Status: DISCONTINUED | OUTPATIENT
Start: 2023-08-20 | End: 2023-08-23 | Stop reason: HOSPADM

## 2023-08-20 RX ORDER — ONDANSETRON 2 MG/ML
4 INJECTION INTRAMUSCULAR; INTRAVENOUS EVERY 6 HOURS PRN
Status: DISCONTINUED | OUTPATIENT
Start: 2023-08-20 | End: 2023-08-23 | Stop reason: HOSPADM

## 2023-08-20 RX ADMIN — ACETAMINOPHEN 650 MG: 325 TABLET ORAL at 19:22

## 2023-08-20 RX ADMIN — PIPERACILLIN AND TAZOBACTAM 3375 MG: 3; .375 INJECTION, POWDER, LYOPHILIZED, FOR SOLUTION INTRAVENOUS at 23:26

## 2023-08-20 RX ADMIN — SODIUM CHLORIDE: 9 INJECTION, SOLUTION INTRAVENOUS at 21:31

## 2023-08-20 RX ADMIN — VANCOMYCIN HYDROCHLORIDE 750 MG: 750 INJECTION, POWDER, LYOPHILIZED, FOR SOLUTION INTRAVENOUS at 15:31

## 2023-08-20 RX ADMIN — PIPERACILLIN AND TAZOBACTAM 4500 MG: 4; .5 INJECTION, POWDER, LYOPHILIZED, FOR SOLUTION INTRAVENOUS at 14:44

## 2023-08-20 RX ADMIN — IOPAMIDOL 75 ML: 755 INJECTION, SOLUTION INTRAVENOUS at 11:54

## 2023-08-20 RX ADMIN — SODIUM CHLORIDE 1000 ML: 9 INJECTION, SOLUTION INTRAVENOUS at 10:34

## 2023-08-20 RX ADMIN — SODIUM CHLORIDE: 9 INJECTION, SOLUTION INTRAVENOUS at 17:02

## 2023-08-20 RX ADMIN — ROSUVASTATIN CALCIUM 40 MG: 20 TABLET, FILM COATED ORAL at 17:46

## 2023-08-20 ASSESSMENT — PAIN SCALES - GENERAL
PAINLEVEL_OUTOF10: 1
PAINLEVEL_OUTOF10: 0
PAINLEVEL_OUTOF10: 0
PAINLEVEL_OUTOF10: 1
PAINLEVEL_OUTOF10: 0
PAINLEVEL_OUTOF10: 1

## 2023-08-20 ASSESSMENT — ENCOUNTER SYMPTOMS
APNEA: 0
VOMITING: 0
CONSTIPATION: 0
ABDOMINAL PAIN: 1
COUGH: 0
BACK PAIN: 0
ALLERGIC/IMMUNOLOGIC NEGATIVE: 1
RESPIRATORY NEGATIVE: 1
NAUSEA: 0
ANAL BLEEDING: 0
CHEST TIGHTNESS: 0
GASTROINTESTINAL NEGATIVE: 1
SHORTNESS OF BREATH: 0
BLOOD IN STOOL: 0
EYES NEGATIVE: 1

## 2023-08-20 ASSESSMENT — PAIN - FUNCTIONAL ASSESSMENT
PAIN_FUNCTIONAL_ASSESSMENT: ACTIVITIES ARE NOT PREVENTED
PAIN_FUNCTIONAL_ASSESSMENT: ACTIVITIES ARE NOT PREVENTED
PAIN_FUNCTIONAL_ASSESSMENT: 0-10

## 2023-08-20 ASSESSMENT — PAIN DESCRIPTION - DESCRIPTORS
DESCRIPTORS: OTHER (COMMENT)
DESCRIPTORS: DISCOMFORT
DESCRIPTORS: DISCOMFORT

## 2023-08-20 ASSESSMENT — LIFESTYLE VARIABLES
HOW OFTEN DO YOU HAVE A DRINK CONTAINING ALCOHOL: 2-3 TIMES A WEEK
HOW MANY STANDARD DRINKS CONTAINING ALCOHOL DO YOU HAVE ON A TYPICAL DAY: 5 OR 6

## 2023-08-20 ASSESSMENT — PAIN DESCRIPTION - ONSET: ONSET: ON-GOING

## 2023-08-20 ASSESSMENT — PAIN DESCRIPTION - ORIENTATION: ORIENTATION: OTHER (COMMENT)

## 2023-08-20 ASSESSMENT — PAIN DESCRIPTION - LOCATION
LOCATION: GROIN
LOCATION: ABDOMEN

## 2023-08-20 ASSESSMENT — PAIN DESCRIPTION - PAIN TYPE: TYPE: ACUTE PAIN

## 2023-08-20 ASSESSMENT — PAIN DESCRIPTION - FREQUENCY
FREQUENCY: CONTINUOUS
FREQUENCY: CONTINUOUS

## 2023-08-20 NOTE — PLAN OF CARE
Problem: Discharge Planning  Goal: Discharge to home or other facility with appropriate resources  8/20/2023 1910 by Roxy Carmichael RN  Outcome: Progressing  8/20/2023 1719 by Craig Simon RN  Outcome: Progressing  Flowsheets (Taken 8/20/2023 1716)  Discharge to home or other facility with appropriate resources:   Identify barriers to discharge with patient and caregiver   Identify discharge learning needs (meds, wound care, etc)     Problem: Pain  Goal: Verbalizes/displays adequate comfort level or baseline comfort level  8/20/2023 1910 by Roxy Carmichael RN  Outcome: Progressing  8/20/2023 1719 by Craig Simon RN  Outcome: Progressing     Problem: Safety - Adult  Goal: Free from fall injury  8/20/2023 1910 by Roxy Carmichael RN  Outcome: Progressing  8/20/2023 1719 by Craig Simon RN  Outcome: Progressing     Problem: ABCDS Injury Assessment  Goal: Absence of physical injury  8/20/2023 1910 by Roxy Carmichael RN  Outcome: Progressing  8/20/2023 1719 by Craig Simon RN  Outcome: Progressing

## 2023-08-20 NOTE — H&P
[address]     Department of Internal Medicine - Staff Internal Medicine Teaching Service          ADMISSION NOTE/HISTORY AND PHYSICAL EXAMINATION   Date: 8/20/2023  Patient Name: Sola Montes De Oca  Date of admission: 8/20/2023  9:54 AM  YOB: 1950  PCP: Kristin Gill DO  History Obtained From:  patient    CHIEF COMPLAINT     Chief complaint: Fatigue and generalized weakness    HISTORY OF PRESENTING ILLNESS     The patient is a pleasant 68 y.o. male with a PMHx significant for     Prostate surgery for prostate cancer done on 04/04/2023  Patient basal cell carcinoma  Hypertension  GERD  Hemorrhoids      presents with a chief complaint of fatigue, weakness, decreased appetite and unintentional weight loss for the past couple of days. He also complains of the left lower abdominal pain. The pain is dull, moderate discomfort, nonradiating, on and off. Not associated with any fever, nausea, any vomiting, any painful urination, hematuria, bloody stools, constipation. Patient came to the hospital with similar complaints last week was febrile 100.9, rigors or chills generalized body aches,hypotensive with blood pressure 92/60. His complete blood work-up was done which came out to be negative and they inferred it as viral illness. Please note that the patient had his robotic assisted laparoscopic prostatectomy done on 6/16/2023. Patient had a nonsignificant postop. And was healing nicely was recommended to follow-up after 3 months by urology. In the ED CT abdomen pelvis was done which showed abscess measuring up to 5.5 cm with adjacent inflammation extending the left inguinal region. On my evaluation the patient is afebrile, hemodynamically stable with with blood pressure 112/75, heart rate of 60, saturating  97% on room air. Labs show WBC count of 11.2   Hb of 12.6<14.3  Blood cultures collected 5 days ago showed no growth.     Currently on Zosyn every 6 hourly,

## 2023-08-20 NOTE — ED NOTES
Resident at bedside to update patient on plan of care and family.       Carmel Boyd RN  08/20/23 6553

## 2023-08-20 NOTE — ED TRIAGE NOTES
Pt presents to ED room 1 ambulatory from triage c/o generalized weakness, chills, low grade fever, and hypotension that has been ongoing since the 14th. Pt states his blood pressure is normally 553G systolic and he does have HTN. Pt states that he has not been taking his anti-hypertensive's due to his low blood pressure. Pt states that he has been routinely checking his blood pressure at home. Pt states that he took two motrin's this morning PTA and states that it has helped a little bit.

## 2023-08-20 NOTE — CARE COORDINATION
Case Management Assessment  Initial Evaluation    Date/Time of Evaluation: 8/20/2023 5:41 PM  Assessment Completed by: Tasneem Martin RN    If patient is discharged prior to next notation, then this note serves as note for discharge by case management. Patient Name: Demetrice Morales                   YOB: 1950  Diagnosis: Postoperative intra-abdominal abscess [T81.43XA]  Abscess [L02.91]                   Date / Time: 8/20/2023  9:54 AM    Patient Admission Status: Inpatient   Readmission Risk (Low < 19, Mod (19-27), High > 27): Readmission Risk Score: 8.1    Current PCP: Tr Hurst DO  PCP verified by CM? Yes (Jane Goldmann DO)    Chart Reviewed: Yes      History Provided by: Patient  Patient Orientation: Alert and Oriented    Patient Cognition: Alert    Hospitalization in the last 30 days (Readmission):  No    If yes, Readmission Assessment in CM Navigator will be completed. Advance Directives:      Code Status: Full Code   Patient's Primary Decision Maker is: Legal Next of Kin    Primary Decision MakerVeliote [place] - [phone]    Discharge Planning:    Patient lives with: Spouse/Significant Other Type of Home: House  Primary Care Giver: Self  Patient Support Systems include: Spouse/Significant Other   Current Financial resources: Medicare  Current community resources: None  Current services prior to admission: None            Current DME:  none            Type of Home Care services:  None    ADLS  Prior functional level: Independent in ADLs/IADLs  Current functional level: Independent in ADLs/IADLs    PT AM-PAC:   /24  OT AM-PAC:   /24    Family can provide assistance at DC: Yes  Would you like Case Management to discuss the discharge plan with any other family members/significant others, and if so, who?     Plans to Return to Present Housing: Yes  Other Identified Issues/Barriers to RETURNING to current housing: none  Potential Assistance needed at discharge: N/A

## 2023-08-20 NOTE — PLAN OF CARE
Problem: Discharge Planning  Goal: Discharge to home or other facility with appropriate resources  Outcome: Progressing  Flowsheets (Taken 8/20/2023 1716)  Discharge to home or other facility with appropriate resources:   Identify barriers to discharge with patient and caregiver   Identify discharge learning needs (meds, wound care, etc)     Problem: Pain  Goal: Verbalizes/displays adequate comfort level or baseline comfort level  Outcome: Progressing     Problem: Safety - Adult  Goal: Free from fall injury  Outcome: Progressing     Problem: ABCDS Injury Assessment  Goal: Absence of physical injury  Outcome: Progressing

## 2023-08-20 NOTE — ED NOTES
ED to inpatient nurses report    Chief Complaint   Patient presents with    Hypotension    Sweats    Fatigue    Anorexia    Abdominal Pain      Present to ED from home  LOC: alert and orientated to name, place, date  Vital signs   Vitals:    08/20/23 1030 08/20/23 1115 08/20/23 1130 08/20/23 1145   BP: 97/64 101/64 (!) 96/58 93/62   Pulse: 54 58 54 53   Resp: 11 19 22 24   Temp:       TempSrc:       SpO2: 96% 96% 97% 96%   Weight:       Height:          Oxygen Baseline RA    Current needs required none   LDAs:   Peripheral IV 08/20/23 Distal;Right Cephalic (Active)     Mobility: Independent  Pending ED orders: vancomycin due after zosyn  Present condition: stable  Code Status: [unfilled]   Consults:  []  Hospitalist  Completed  [] yes [] no  [x]  Medicine  Completed  [] yes [x] No  []  Cardiology  Completed  [] yes [] No  []  GI   Completed  [] yes [] No  []  Neurology  Completed  [] yes [] No  []  Nephrology Completed  [] yes [] No  []  Vascular  Completed  [] yes [] No   []  Surgery  Completed  [] yes [] No   []  Urology  Completed  [] yes [] No   []  Plastics  Completed  [] yes [] No   []  ENT  Completed  [] yes [] No   []  Other n/a  Completed  [] yes [] No  Pertinent event(s) see blank notes  Pertinent event(s) Pt presents to ED room 1 ambulatory from triage c/o generalized weakness, chills, low grade fever, and hypotension that has been ongoing since the 14th. Pt states his blood pressure is normally 773Q systolic and he does have HTN. Pt states that he has not been taking his anti-hypertensive's due to his low blood pressure. Pt states that he has been routinely checking his blood pressure at home. Pt states that he took two motrin's this morning PTA and states that it has helped a little bit.    Electronically signed by Kyrie Reddy RN on 8/20/2023 at 3:04 PM       Kyrie Reddy RN  08/20/23 1586

## 2023-08-21 ENCOUNTER — APPOINTMENT (OUTPATIENT)
Dept: CT IMAGING | Age: 73
DRG: 862 | End: 2023-08-21
Payer: MEDICARE

## 2023-08-21 LAB
ANION GAP SERPL CALCULATED.3IONS-SCNC: 11 MMOL/L (ref 9–17)
BASOPHILS # BLD: 0 K/UL (ref 0–0.2)
BASOPHILS NFR BLD: 0 % (ref 0–2)
BUN SERPL-MCNC: 12 MG/DL (ref 8–23)
CALCIUM SERPL-MCNC: 8.8 MG/DL (ref 8.6–10.4)
CHLORIDE SERPL-SCNC: 106 MMOL/L (ref 98–107)
CO2 SERPL-SCNC: 21 MMOL/L (ref 20–31)
CREAT SERPL-MCNC: 0.8 MG/DL (ref 0.7–1.2)
CRP SERPL HS-MCNC: 254.1 MG/L (ref 0–5)
EOSINOPHIL # BLD: 0.09 K/UL (ref 0–0.44)
EOSINOPHILS RELATIVE PERCENT: 1 % (ref 1–4)
ERYTHROCYTE [DISTWIDTH] IN BLOOD BY AUTOMATED COUNT: 13.3 % (ref 11.8–14.4)
GFR SERPL CREATININE-BSD FRML MDRD: >60 ML/MIN/1.73M2
GLUCOSE SERPL-MCNC: 105 MG/DL (ref 70–99)
HCT VFR BLD AUTO: 33.6 % (ref 40.7–50.3)
HGB BLD-MCNC: 11.3 G/DL (ref 13–17)
IMM GRANULOCYTES # BLD AUTO: 0.09 K/UL (ref 0–0.3)
IMM GRANULOCYTES NFR BLD: 1 %
INR PPP: 1.3
LYMPHOCYTES NFR BLD: 0.62 K/UL (ref 1.1–3.7)
LYMPHOCYTES RELATIVE PERCENT: 7 % (ref 24–43)
MCH RBC QN AUTO: 31.4 PG (ref 25.2–33.5)
MCHC RBC AUTO-ENTMCNC: 33.6 G/DL (ref 28.4–34.8)
MCV RBC AUTO: 93.3 FL (ref 82.6–102.9)
MICROORGANISM SPEC CULT: NORMAL
MONOCYTES NFR BLD: 0.88 K/UL (ref 0.1–1.2)
MONOCYTES NFR BLD: 10 % (ref 3–12)
MORPHOLOGY: NORMAL
NEUTROPHILS NFR BLD: 81 % (ref 36–65)
NEUTS SEG NFR BLD: 7.12 K/UL (ref 1.5–8.1)
NRBC BLD-RTO: 0 PER 100 WBC
PLATELET # BLD AUTO: 229 K/UL (ref 138–453)
PMV BLD AUTO: 9.9 FL (ref 8.1–13.5)
POTASSIUM SERPL-SCNC: 3.9 MMOL/L (ref 3.7–5.3)
PROTHROMBIN TIME: 15.9 SEC (ref 11.7–14.9)
RBC # BLD AUTO: 3.6 M/UL (ref 4.21–5.77)
SODIUM SERPL-SCNC: 138 MMOL/L (ref 135–144)
SPECIMEN DESCRIPTION: NORMAL
WBC OTHER # BLD: 8.8 K/UL (ref 3.5–11.3)

## 2023-08-21 PROCEDURE — 6370000000 HC RX 637 (ALT 250 FOR IP)

## 2023-08-21 PROCEDURE — 87205 SMEAR GRAM STAIN: CPT

## 2023-08-21 PROCEDURE — 87070 CULTURE OTHR SPECIMN AEROBIC: CPT

## 2023-08-21 PROCEDURE — 80048 BASIC METABOLIC PNL TOTAL CA: CPT

## 2023-08-21 PROCEDURE — 2580000003 HC RX 258

## 2023-08-21 PROCEDURE — 85025 COMPLETE CBC W/AUTO DIFF WBC: CPT

## 2023-08-21 PROCEDURE — 6360000002 HC RX W HCPCS

## 2023-08-21 PROCEDURE — 85610 PROTHROMBIN TIME: CPT

## 2023-08-21 PROCEDURE — C1769 GUIDE WIRE: HCPCS

## 2023-08-21 PROCEDURE — 36415 COLL VENOUS BLD VENIPUNCTURE: CPT

## 2023-08-21 PROCEDURE — 87077 CULTURE AEROBIC IDENTIFY: CPT

## 2023-08-21 PROCEDURE — 0W9G30Z DRAINAGE OF PERITONEAL CAVITY WITH DRAINAGE DEVICE, PERCUTANEOUS APPROACH: ICD-10-PCS | Performed by: RADIOLOGY

## 2023-08-21 PROCEDURE — 6360000002 HC RX W HCPCS: Performed by: RADIOLOGY

## 2023-08-21 PROCEDURE — 87075 CULTR BACTERIA EXCEPT BLOOD: CPT

## 2023-08-21 PROCEDURE — 1200000000 HC SEMI PRIVATE

## 2023-08-21 PROCEDURE — 99232 SBSQ HOSP IP/OBS MODERATE 35: CPT | Performed by: INTERNAL MEDICINE

## 2023-08-21 PROCEDURE — 2580000003 HC RX 258: Performed by: PHYSICIAN ASSISTANT

## 2023-08-21 PROCEDURE — 86140 C-REACTIVE PROTEIN: CPT

## 2023-08-21 RX ORDER — SODIUM CHLORIDE 0.9 % (FLUSH) 0.9 %
10 SYRINGE (ML) INJECTION 2 TIMES DAILY
Status: DISCONTINUED | OUTPATIENT
Start: 2023-08-21 | End: 2023-08-23 | Stop reason: HOSPADM

## 2023-08-21 RX ORDER — METRONIDAZOLE 500 MG/100ML
500 INJECTION, SOLUTION INTRAVENOUS EVERY 8 HOURS
Status: DISCONTINUED | OUTPATIENT
Start: 2023-08-21 | End: 2023-08-23

## 2023-08-21 RX ORDER — CIPROFLOXACIN 2 MG/ML
400 INJECTION, SOLUTION INTRAVENOUS EVERY 12 HOURS
Status: DISCONTINUED | OUTPATIENT
Start: 2023-08-21 | End: 2023-08-23

## 2023-08-21 RX ORDER — MIDAZOLAM HYDROCHLORIDE 2 MG/2ML
INJECTION, SOLUTION INTRAMUSCULAR; INTRAVENOUS PRN
Status: COMPLETED | OUTPATIENT
Start: 2023-08-21 | End: 2023-08-21

## 2023-08-21 RX ORDER — LANOLIN ALCOHOL/MO/W.PET/CERES
3 CREAM (GRAM) TOPICAL NIGHTLY PRN
Status: DISCONTINUED | OUTPATIENT
Start: 2023-08-21 | End: 2023-08-23 | Stop reason: HOSPADM

## 2023-08-21 RX ORDER — FENTANYL CITRATE 50 UG/ML
INJECTION, SOLUTION INTRAMUSCULAR; INTRAVENOUS PRN
Status: COMPLETED | OUTPATIENT
Start: 2023-08-21 | End: 2023-08-21

## 2023-08-21 RX ADMIN — METRONIDAZOLE 500 MG: 500 INJECTION, SOLUTION INTRAVENOUS at 08:12

## 2023-08-21 RX ADMIN — SODIUM CHLORIDE, PRESERVATIVE FREE 10 ML: 5 INJECTION INTRAVENOUS at 10:38

## 2023-08-21 RX ADMIN — MIDAZOLAM HYDROCHLORIDE 1 MG: 1 INJECTION, SOLUTION INTRAMUSCULAR; INTRAVENOUS at 09:43

## 2023-08-21 RX ADMIN — CIPROFLOXACIN 400 MG: 2 INJECTION, SOLUTION INTRAVENOUS at 21:10

## 2023-08-21 RX ADMIN — ROSUVASTATIN CALCIUM 40 MG: 20 TABLET, FILM COATED ORAL at 19:07

## 2023-08-21 RX ADMIN — FENTANYL CITRATE 50 MCG: 50 INJECTION, SOLUTION INTRAMUSCULAR; INTRAVENOUS at 09:43

## 2023-08-21 RX ADMIN — FENTANYL CITRATE 25 MCG: 50 INJECTION, SOLUTION INTRAMUSCULAR; INTRAVENOUS at 09:55

## 2023-08-21 RX ADMIN — Medication 3 MG: at 21:11

## 2023-08-21 RX ADMIN — MIDAZOLAM HYDROCHLORIDE 0.5 MG: 1 INJECTION, SOLUTION INTRAMUSCULAR; INTRAVENOUS at 09:55

## 2023-08-21 RX ADMIN — CIPROFLOXACIN 400 MG: 2 INJECTION, SOLUTION INTRAVENOUS at 10:33

## 2023-08-21 RX ADMIN — SODIUM CHLORIDE: 9 INJECTION, SOLUTION INTRAVENOUS at 13:22

## 2023-08-21 RX ADMIN — METRONIDAZOLE 500 MG: 500 INJECTION, SOLUTION INTRAVENOUS at 16:53

## 2023-08-21 ASSESSMENT — ENCOUNTER SYMPTOMS
ALLERGIC/IMMUNOLOGIC NEGATIVE: 1
EYES NEGATIVE: 1
RESPIRATORY NEGATIVE: 1
GASTROINTESTINAL NEGATIVE: 1

## 2023-08-21 NOTE — POST SEDATION
Sedation Post Procedure Note    Patient Name: Eliceo Hilton   Date of IDQQE:7/9/[birth year]42  Room/Bed: 7889/6133-45  Medical Record Number: [id number]  Date: 8/21/2023   Time: 10:19 AM         Physicians/Assistants: RICH Quezada    Procedure Performed:  Abscess drain    Post-Sedation Vital Signs:  Vitals:    08/21/23 1010   BP: 114/61   Pulse: 57   Resp: 11   Temp:    SpO2: 96%      Vital signs were reviewed and were stable after the procedure (see flow sheet for vitals)            Post-Sedation Exam: pt remains stable           Complications: none    Electronically signed by RICH Kimble on 8/21/2023 at 10:19 AM

## 2023-08-21 NOTE — CARE COORDINATION
TRANSITIONAL CARE PLANNING/ [facility] Day:     Reason for Admission: Postoperative intra-abdominal abscess [T81.43XA]  Abscess [L02.91]     Treatment Plan of Care: Urology on board IR consulted for abscess drainage possibly today.     Tests/Procedures still needed: as above    Barriers to Discharge: medical clearance    Readmission Risk              Risk of Unplanned Readmission:  12            Patient goals/Treatment Goal is home with spouse  Preferences/Transitional Plan: Monitor for potential HC and IV antx needs    Referrals Made: may need referrals    Follow Up needed: as per transitional planning

## 2023-08-21 NOTE — TELEPHONE ENCOUNTER
Please call her back and tell her I am aware and the residents saw him today and I will stop by later tonight.

## 2023-08-21 NOTE — BRIEF OP NOTE
Brief Postoperative Note    Herlinda Valverde  YOB: 1950  [id number]    Pre-operative Diagnosis: Pelvic Abscess      Post-operative Diagnosis: Same    Procedure: Abscess drain placement    Medication Given: fentanyl and versed    Anesthesia: 1%Lidocaine     Surgeons/Assistants:  Cheryl Browne MD and Irma Gregg PA-C    Estimated Blood Loss: Minimal    Complications: none    Specimen: Was collected    Procedure:  Successful placement of 8 Sudanese pigtail catheter in pelvic abscess. Approximately 14 mL of purulent fluid was obtained. Drain was sutured to skin and stay fix was applied. YOLA bulb was attached. Pt tolerated procedure well and left the department in stable condition.       Electronically signed by RICH Solorzano on 8/21/2023 at 10:18 AM

## 2023-08-21 NOTE — TELEPHONE ENCOUNTER
SPOKE WITH PT (DIALED WIFE'S NO.) AND HE SAID SHE IS ON THE ROAD- HE SAID HE HAD A PROCEDURE AT 10:00 AND IS HOPING TO BE DISCHARGED [id number]

## 2023-08-21 NOTE — PLAN OF CARE
Problem: Discharge Planning  Goal: Discharge to home or other facility with appropriate resources  8/21/2023 1645 by Sravani West RN  Outcome: Progressing  Flowsheets (Taken 8/21/2023 0800)  Discharge to home or other facility with appropriate resources: Identify barriers to discharge with patient and caregiver  8/21/2023 0629 by Zenaida Ta RN  Outcome: Progressing     Problem: Pain  Goal: Verbalizes/displays adequate comfort level or baseline comfort level  8/21/2023 1645 by Sravani West RN  Outcome: Progressing  8/21/2023 0629 by Zenaida Ta RN  Outcome: Progressing     Problem: Safety - Adult  Goal: Free from fall injury  8/21/2023 1645 by Sravani West RN  Outcome: Progressing  8/21/2023 0629 by Zenaida Ta RN  Outcome: Progressing     Problem: ABCDS Injury Assessment  Goal: Absence of physical injury  8/21/2023 1645 by Sravani West RN  Outcome: Progressing  8/21/2023 0629 by Zenaida Ta RN  Outcome: Progressing     Problem: Nutrition Deficit:  Goal: Optimize nutritional status  Outcome: Progressing

## 2023-08-21 NOTE — H&P
[facility]  Internal Medicine Teaching Residency Program  Inpatient Daily Progress Note  ______________________________________________________________________________    Patient: Manny Mendez  YOB: 1950   EIB:[id number]    Acct: [de-identified]     Room: [facility]8-  Admit date: 8/20/2023  Today's date: 08/21/23  Number of days in the hospital: 1    SUBJECTIVE   Admitting Diagnosis: Intra-abdominal abscess ([address])  CC: Abdominal pain   Abdominal pain  still the same, more like a discomfort. No history of any nausea vomiting. Afebrile  Hemodynamically stable. On room air maintaining saturation 95%  Urology on board IR consulted for abscess drainage possibly today. ROS:  Constitutional:  negative for chills, fevers, sweats  Respiratory:  negative for cough, dyspnea on exertion, hemoptysis, shortness of breath, wheezing  Cardiovascular:  negative for chest pain, chest pressure/discomfort, lower extremity edema, palpitations  Gastrointestinal: Left inguinal region pain, constipation, diarrhea, nausea, vomiting  Neurological:  negative for dizziness, headache    BRIEF HISTORY     presents with a chief complaint of fatigue, weakness, decreased appetite and unintentional weight loss for the past couple of days. He also complains of the left lower abdominal pain. The pain is dull, moderate discomfort, nonradiating, on and off. Not associated with any fever, nausea, any vomiting, any painful urination, hematuria, bloody stools, constipation. Patient came to the hospital with similar complaints last week was febrile 100.9, rigors or chills generalized body aches,hypotensive with blood pressure 92/60. His complete blood work-up was done which came out to be negative and they inferred it as viral illness. Please note that the patient had his robotic assisted laparoscopic prostatectomy done on 6/16/2023. Patient had a nonsignificant postop.   And was

## 2023-08-21 NOTE — PLAN OF CARE
Problem: Discharge Planning  Goal: Discharge to home or other facility with appropriate resources  8/21/2023 0629 by Herb Diaz RN  Outcome: Progressing  8/20/2023 1910 by La Nena Hale RN  Outcome: Progressing  Flowsheets (Taken 8/20/2023 1910)  Discharge to home or other facility with appropriate resources: Identify barriers to discharge with patient and caregiver  8/20/2023 1719 by Migdalia Arnold RN  Outcome: Progressing  Flowsheets (Taken 8/20/2023 1716)  Discharge to home or other facility with appropriate resources:   Identify barriers to discharge with patient and caregiver   Identify discharge learning needs (meds, wound care, etc)     Problem: Pain  Goal: Verbalizes/displays adequate comfort level or baseline comfort level  8/21/2023 0629 by Herb Diaz RN  Outcome: Progressing  8/20/2023 1910 by La Nena Hale RN  Outcome: Progressing  8/20/2023 1719 by Migdalia Arnold RN  Outcome: Progressing     Problem: Safety - Adult  Goal: Free from fall injury  8/21/2023 0629 by Herb Diaz RN  Outcome: Progressing  8/20/2023 1910 by La Nena Hale RN  Outcome: Progressing  8/20/2023 1719 by Migdalia Arnold RN  Outcome: Progressing     Problem: ABCDS Injury Assessment  Goal: Absence of physical injury  8/21/2023 0629 by Herb Diaz RN  Outcome: Progressing  8/20/2023 1910 by La Nena Hale RN  Outcome: Progressing  8/20/2023 1719 by Migdalia Arnold RN  Outcome: Progressing

## 2023-08-22 LAB
ANION GAP SERPL CALCULATED.3IONS-SCNC: 11 MMOL/L (ref 9–17)
BASOPHILS # BLD: 0.06 K/UL (ref 0–0.2)
BASOPHILS NFR BLD: 1 % (ref 0–2)
BUN SERPL-MCNC: 10 MG/DL (ref 8–23)
CALCIUM SERPL-MCNC: 8.2 MG/DL (ref 8.6–10.4)
CHLORIDE SERPL-SCNC: 108 MMOL/L (ref 98–107)
CO2 SERPL-SCNC: 20 MMOL/L (ref 20–31)
CREAT SERPL-MCNC: 0.8 MG/DL (ref 0.7–1.2)
CRP SERPL HS-MCNC: 180.9 MG/L (ref 0–5)
EKG ATRIAL RATE: 58 BPM
EKG P AXIS: 14 DEGREES
EKG P-R INTERVAL: 148 MS
EKG Q-T INTERVAL: 414 MS
EKG QRS DURATION: 94 MS
EKG QTC CALCULATION (BAZETT): 406 MS
EKG R AXIS: -3 DEGREES
EKG T AXIS: 6 DEGREES
EKG VENTRICULAR RATE: 58 BPM
EOSINOPHIL # BLD: 0.24 K/UL (ref 0–0.4)
EOSINOPHILS RELATIVE PERCENT: 4 % (ref 1–4)
ERYTHROCYTE [DISTWIDTH] IN BLOOD BY AUTOMATED COUNT: 13.7 % (ref 11.8–14.4)
GFR SERPL CREATININE-BSD FRML MDRD: >60 ML/MIN/1.73M2
GLUCOSE SERPL-MCNC: 105 MG/DL (ref 70–99)
HCT VFR BLD AUTO: 33.9 % (ref 40.7–50.3)
HGB BLD-MCNC: 10.5 G/DL (ref 13–17)
IMM GRANULOCYTES # BLD AUTO: 0 K/UL (ref 0–0.3)
IMM GRANULOCYTES NFR BLD: 0 %
LYMPHOCYTES NFR BLD: 0.43 K/UL (ref 1–4.8)
LYMPHOCYTES RELATIVE PERCENT: 7 % (ref 24–44)
MCH RBC QN AUTO: 30.3 PG (ref 25.2–33.5)
MCHC RBC AUTO-ENTMCNC: 31 G/DL (ref 28.4–34.8)
MCV RBC AUTO: 97.7 FL (ref 82.6–102.9)
MONOCYTES NFR BLD: 0.43 K/UL (ref 0.1–0.8)
MONOCYTES NFR BLD: 7 % (ref 1–7)
MORPHOLOGY: NORMAL
NEUTROPHILS NFR BLD: 81 % (ref 36–66)
NEUTS SEG NFR BLD: 4.94 K/UL (ref 1.8–7.7)
NRBC BLD-RTO: 0 PER 100 WBC
PLATELET # BLD AUTO: 255 K/UL (ref 138–453)
PMV BLD AUTO: 10 FL (ref 8.1–13.5)
POTASSIUM SERPL-SCNC: 3.7 MMOL/L (ref 3.7–5.3)
RBC # BLD AUTO: 3.47 M/UL (ref 4.21–5.77)
SODIUM SERPL-SCNC: 139 MMOL/L (ref 135–144)
WBC OTHER # BLD: 6.1 K/UL (ref 3.5–11.3)

## 2023-08-22 PROCEDURE — 1200000000 HC SEMI PRIVATE

## 2023-08-22 PROCEDURE — 80048 BASIC METABOLIC PNL TOTAL CA: CPT

## 2023-08-22 PROCEDURE — 93010 ELECTROCARDIOGRAM REPORT: CPT | Performed by: INTERNAL MEDICINE

## 2023-08-22 PROCEDURE — 85025 COMPLETE CBC W/AUTO DIFF WBC: CPT

## 2023-08-22 PROCEDURE — 2580000003 HC RX 258

## 2023-08-22 PROCEDURE — 6360000002 HC RX W HCPCS

## 2023-08-22 PROCEDURE — 6370000000 HC RX 637 (ALT 250 FOR IP)

## 2023-08-22 PROCEDURE — 99232 SBSQ HOSP IP/OBS MODERATE 35: CPT | Performed by: INTERNAL MEDICINE

## 2023-08-22 PROCEDURE — 36415 COLL VENOUS BLD VENIPUNCTURE: CPT

## 2023-08-22 PROCEDURE — 86140 C-REACTIVE PROTEIN: CPT

## 2023-08-22 PROCEDURE — 2580000003 HC RX 258: Performed by: PHYSICIAN ASSISTANT

## 2023-08-22 RX ORDER — DIPHENHYDRAMINE HYDROCHLORIDE 50 MG/ML
25 INJECTION INTRAMUSCULAR; INTRAVENOUS EVERY 6 HOURS PRN
Status: DISCONTINUED | OUTPATIENT
Start: 2023-08-22 | End: 2023-08-23 | Stop reason: HOSPADM

## 2023-08-22 RX ADMIN — DIPHENHYDRAMINE HYDROCHLORIDE 25 MG: 50 INJECTION INTRAMUSCULAR; INTRAVENOUS at 23:35

## 2023-08-22 RX ADMIN — SODIUM CHLORIDE, PRESERVATIVE FREE 10 ML: 5 INJECTION INTRAVENOUS at 01:00

## 2023-08-22 RX ADMIN — DIPHENHYDRAMINE HYDROCHLORIDE 25 MG: 50 INJECTION INTRAMUSCULAR; INTRAVENOUS at 16:09

## 2023-08-22 RX ADMIN — METRONIDAZOLE 500 MG: 500 INJECTION, SOLUTION INTRAVENOUS at 08:55

## 2023-08-22 RX ADMIN — METRONIDAZOLE 500 MG: 500 INJECTION, SOLUTION INTRAVENOUS at 01:02

## 2023-08-22 RX ADMIN — ENOXAPARIN SODIUM 40 MG: 100 INJECTION SUBCUTANEOUS at 08:58

## 2023-08-22 RX ADMIN — ROSUVASTATIN CALCIUM 40 MG: 20 TABLET, FILM COATED ORAL at 18:06

## 2023-08-22 RX ADMIN — SODIUM CHLORIDE: 9 INJECTION, SOLUTION INTRAVENOUS at 00:59

## 2023-08-22 RX ADMIN — CIPROFLOXACIN 400 MG: 2 INJECTION, SOLUTION INTRAVENOUS at 10:11

## 2023-08-22 RX ADMIN — SODIUM CHLORIDE, PRESERVATIVE FREE 10 ML: 5 INJECTION INTRAVENOUS at 23:37

## 2023-08-22 NOTE — PLAN OF CARE
Attestation and add on       I have discussed the care of Ritesh Diggs , including pertinent history and exam findings,      8/22/23    with the resident. I have seen and examined the patient and the key elements of all parts of the encounter have been performed by me . I agree with the assessment, plan and orders as documented by the resident. Hospital Problems             Last Modified POA    * (Principal) Intra-abdominal abscess ([address]) 8/20/2023 Yes    History of prostate cancer 8/20/2023 Yes    Hypertension 8/20/2023 Yes           ---improving- ;        Medications: Allergies:  No Known Allergies    Current Meds:   Scheduled Meds:    ciprofloxacin  400 mg IntraVENous Q12H    metroNIDAZOLE  500 mg IntraVENous Q8H    sodium chloride flush  10 mL IntraCATHeter BID    sodium chloride flush  5-40 mL IntraVENous 2 times per day    enoxaparin  40 mg SubCUTAneous Daily    rosuvastatin  40 mg Oral QPM     Continuous Infusions:    sodium chloride      sodium chloride 100 mL/hr at 08/22/23 0059     PRN Meds: melatonin, sodium chloride flush, sodium chloride, ondansetron **OR** ondansetron, polyethylene glycol, acetaminophen **OR** acetaminophen      MD CHAD Joshi[address], [address].    Phone [phone]   Fax: [phone]  Answering Service: [phone]

## 2023-08-22 NOTE — PLAN OF CARE
Problem: Discharge Planning  Goal: Discharge to home or other facility with appropriate resources  8/22/2023 0603 by Jin Romano RN  Outcome: Progressing  8/21/2023 1645 by Al Richter RN  Outcome: Progressing  Flowsheets (Taken 8/21/2023 0800)  Discharge to home or other facility with appropriate resources: Identify barriers to discharge with patient and caregiver     Problem: Pain  Goal: Verbalizes/displays adequate comfort level or baseline comfort level  8/22/2023 0603 by Jin Romano RN  Outcome: Progressing  8/21/2023 1645 by Al Richter RN  Outcome: Progressing     Problem: Safety - Adult  Goal: Free from fall injury  8/22/2023 0603 by Jin Romano RN  Outcome: Progressing  8/21/2023 1645 by Al Richter RN  Outcome: Progressing     Problem: ABCDS Injury Assessment  Goal: Absence of physical injury  8/22/2023 0603 by Jin Romano RN  Outcome: Progressing  8/21/2023 1645 by Al Richter RN  Outcome: Progressing     Problem: Nutrition Deficit:  Goal: Optimize nutritional status  8/22/2023 0603 by Jin Romano RN  Outcome: Progressing  8/21/2023 1645 by Al Richter RN  Outcome: Progressing

## 2023-08-22 NOTE — PLAN OF CARE
Problem: Discharge Planning  Goal: Discharge to home or other facility with appropriate resources  8/22/2023 1701 by Gila Pinto RN  Outcome: Progressing  Flowsheets (Taken 8/22/2023 0800)  Discharge to home or other facility with appropriate resources: Identify barriers to discharge with patient and caregiver  8/22/2023 0603 by Trae Leonard RN  Outcome: Progressing     Problem: Pain  Goal: Verbalizes/displays adequate comfort level or baseline comfort level  8/22/2023 1701 by Gila Pinto RN  Outcome: Progressing  8/22/2023 0603 by Trae Leonard RN  Outcome: Progressing     Problem: Safety - Adult  Goal: Free from fall injury  8/22/2023 1701 by Gila Pinto RN  Outcome: Progressing  8/22/2023 0603 by Trae Leonard RN  Outcome: Progressing     Problem: ABCDS Injury Assessment  Goal: Absence of physical injury  8/22/2023 1701 by Gila Pinto RN  Outcome: Progressing  8/22/2023 0603 by Trae Leonard RN  Outcome: Progressing     Problem: Nutrition Deficit:  Goal: Optimize nutritional status  8/22/2023 1701 by Gila Pinto RN  Outcome: Progressing  8/22/2023 0603 by Trae Leonard RN  Outcome: Progressing

## 2023-08-23 VITALS
TEMPERATURE: 98.6 F | RESPIRATION RATE: 16 BRPM | HEIGHT: 72 IN | HEART RATE: 54 BPM | SYSTOLIC BLOOD PRESSURE: 132 MMHG | WEIGHT: 193.34 LBS | DIASTOLIC BLOOD PRESSURE: 72 MMHG | OXYGEN SATURATION: 98 % | BODY MASS INDEX: 26.19 KG/M2

## 2023-08-23 PROBLEM — T36.8X5A: Status: ACTIVE | Noted: 2023-08-23

## 2023-08-23 PROBLEM — L27.0 ALLERGIC DRUG RASH: Status: ACTIVE | Noted: 2023-08-23

## 2023-08-23 PROBLEM — R79.82 CRP ELEVATED: Status: ACTIVE | Noted: 2023-08-23

## 2023-08-23 LAB
ANION GAP SERPL CALCULATED.3IONS-SCNC: 12 MMOL/L (ref 9–17)
BASOPHILS # BLD: 0 K/UL (ref 0–0.2)
BASOPHILS NFR BLD: 0 % (ref 0–2)
BUN SERPL-MCNC: 10 MG/DL (ref 8–23)
CALCIUM SERPL-MCNC: 8.7 MG/DL (ref 8.6–10.4)
CHLORIDE SERPL-SCNC: 109 MMOL/L (ref 98–107)
CO2 SERPL-SCNC: 20 MMOL/L (ref 20–31)
CREAT SERPL-MCNC: 0.8 MG/DL (ref 0.7–1.2)
EOSINOPHIL # BLD: 0.12 K/UL (ref 0–0.44)
EOSINOPHILS RELATIVE PERCENT: 2 % (ref 1–4)
ERYTHROCYTE [DISTWIDTH] IN BLOOD BY AUTOMATED COUNT: 13.6 % (ref 11.8–14.4)
GFR SERPL CREATININE-BSD FRML MDRD: >60 ML/MIN/1.73M2
GLUCOSE SERPL-MCNC: 99 MG/DL (ref 70–99)
HCT VFR BLD AUTO: 37 % (ref 40.7–50.3)
HGB BLD-MCNC: 11.8 G/DL (ref 13–17)
IMM GRANULOCYTES # BLD AUTO: 0.06 K/UL (ref 0–0.3)
IMM GRANULOCYTES NFR BLD: 1 %
LYMPHOCYTES NFR BLD: 0.61 K/UL (ref 1.1–3.7)
LYMPHOCYTES RELATIVE PERCENT: 10 % (ref 24–43)
MCH RBC QN AUTO: 31 PG (ref 25.2–33.5)
MCHC RBC AUTO-ENTMCNC: 31.9 G/DL (ref 28.4–34.8)
MCV RBC AUTO: 97.1 FL (ref 82.6–102.9)
MONOCYTES NFR BLD: 0.49 K/UL (ref 0.1–1.2)
MONOCYTES NFR BLD: 8 % (ref 3–12)
MORPHOLOGY: NORMAL
NEUTROPHILS NFR BLD: 79 % (ref 36–65)
NEUTS SEG NFR BLD: 4.82 K/UL (ref 1.5–8.1)
NRBC BLD-RTO: 0 PER 100 WBC
PLATELET # BLD AUTO: 291 K/UL (ref 138–453)
PMV BLD AUTO: 10 FL (ref 8.1–13.5)
POTASSIUM SERPL-SCNC: 3.7 MMOL/L (ref 3.7–5.3)
RBC # BLD AUTO: 3.81 M/UL (ref 4.21–5.77)
SODIUM SERPL-SCNC: 141 MMOL/L (ref 135–144)
WBC OTHER # BLD: 6.1 K/UL (ref 3.5–11.3)

## 2023-08-23 PROCEDURE — 80048 BASIC METABOLIC PNL TOTAL CA: CPT

## 2023-08-23 PROCEDURE — 99222 1ST HOSP IP/OBS MODERATE 55: CPT | Performed by: INTERNAL MEDICINE

## 2023-08-23 PROCEDURE — 6370000000 HC RX 637 (ALT 250 FOR IP): Performed by: INTERNAL MEDICINE

## 2023-08-23 PROCEDURE — 6360000002 HC RX W HCPCS

## 2023-08-23 PROCEDURE — 85025 COMPLETE CBC W/AUTO DIFF WBC: CPT

## 2023-08-23 PROCEDURE — 97530 THERAPEUTIC ACTIVITIES: CPT

## 2023-08-23 PROCEDURE — 97165 OT EVAL LOW COMPLEX 30 MIN: CPT

## 2023-08-23 PROCEDURE — 6370000000 HC RX 637 (ALT 250 FOR IP)

## 2023-08-23 PROCEDURE — 36415 COLL VENOUS BLD VENIPUNCTURE: CPT

## 2023-08-23 PROCEDURE — 99232 SBSQ HOSP IP/OBS MODERATE 35: CPT | Performed by: INTERNAL MEDICINE

## 2023-08-23 RX ORDER — AMOXICILLIN 500 MG/1
1000 CAPSULE ORAL EVERY 12 HOURS SCHEDULED
Status: DISCONTINUED | OUTPATIENT
Start: 2023-08-23 | End: 2023-08-23 | Stop reason: HOSPADM

## 2023-08-23 RX ORDER — PREDNISONE 20 MG/1
20 TABLET ORAL DAILY
Qty: 7 TABLET | Refills: 0 | Status: SHIPPED | OUTPATIENT
Start: 2023-08-23 | End: 2023-08-30

## 2023-08-23 RX ORDER — AMOXICILLIN 500 MG/1
1000 CAPSULE ORAL EVERY 12 HOURS SCHEDULED
Qty: 84 CAPSULE | Refills: 0 | Status: SHIPPED | OUTPATIENT
Start: 2023-08-23 | End: 2023-09-13

## 2023-08-23 RX ORDER — PREDNISONE 20 MG/1
20 TABLET ORAL ONCE
Status: COMPLETED | OUTPATIENT
Start: 2023-08-23 | End: 2023-08-23

## 2023-08-23 RX ADMIN — AMOXICILLIN 1000 MG: 500 CAPSULE ORAL at 11:51

## 2023-08-23 RX ADMIN — PREDNISONE 20 MG: 20 TABLET ORAL at 15:09

## 2023-08-23 RX ADMIN — ENOXAPARIN SODIUM 40 MG: 100 INJECTION SUBCUTANEOUS at 08:50

## 2023-08-23 ASSESSMENT — ENCOUNTER SYMPTOMS
APNEA: 0
COLOR CHANGE: 0
EYE DISCHARGE: 0
ABDOMINAL DISTENTION: 0

## 2023-08-23 NOTE — PLAN OF CARE
Problem: Discharge Planning  Goal: Discharge to home or other facility with appropriate resources  8/23/2023 0603 by Дмитрий Suarez RN  Outcome: Progressing  8/22/2023 1701 by Minh Gray RN  Outcome: Progressing  Flowsheets (Taken 8/22/2023 0800)  Discharge to home or other facility with appropriate resources: Identify barriers to discharge with patient and caregiver     Problem: Pain  Goal: Verbalizes/displays adequate comfort level or baseline comfort level  8/23/2023 0603 by Дмитрий Suarez RN  Outcome: Progressing  8/22/2023 1701 by Minh Gray RN  Outcome: Progressing     Problem: Safety - Adult  Goal: Free from fall injury  8/23/2023 0603 by Дмитрий Suarez RN  Outcome: Progressing  8/22/2023 1701 by Minh Gray RN  Outcome: Progressing     Problem: ABCDS Injury Assessment  Goal: Absence of physical injury  8/23/2023 0603 by Дмитрий Suarez RN  Outcome: Progressing  8/22/2023 1701 by Minh Gray RN  Outcome: Progressing     Problem: Nutrition Deficit:  Goal: Optimize nutritional status  8/22/2023 1701 by Minh Gray, RN  Outcome: Progressing

## 2023-08-23 NOTE — DISCHARGE INSTRUCTIONS
- You were seen at [address] [place] [address] for pelvic abscess  -You were seen by urology. IR was consulted and drain was placed for abscess drainage  -You were initially started on ciprofloxacin and Flagyl but you developed reaction likely to ciprofloxacin. Infectious disease was consulted and you have been prescribed amoxicillin 500 mg capsule to be taken twice daily for 21 days. Look out for new signs of  -You are being discharged on prednisone 20 mg tablet t to be taken for 7 days because of your allergic reaction  -Follow-up with urology outpatient for drain removal.  Will probably need CT abdomen and pelvis once drainage decreases for decision of repositioning/removal of drain.   Please discuss with urology once you follow-up with them  -Follow-up with infectious disease within 2 weeks because of your antibiotic change  -Follow-up with your PCP within 2 weeks  -Return to ER if your condition deteriorates

## 2023-08-23 NOTE — PLAN OF CARE
Problem: Discharge Planning  Goal: Discharge to home or other facility with appropriate resources  8/23/2023 1312 by Mann Whitman RN  Outcome: Completed  8/23/2023 0603 by Jin Romano RN  Outcome: Progressing     Problem: Pain  Goal: Verbalizes/displays adequate comfort level or baseline comfort level  8/23/2023 1312 by Mann Whitman RN  Outcome: Completed  8/23/2023 0603 by Jin Romano RN  Outcome: Progressing     Problem: Safety - Adult  Goal: Free from fall injury  8/23/2023 1312 by Mann Whitman RN  Outcome: Completed  8/23/2023 0603 by Jin Romano RN  Outcome: Progressing     Problem: ABCDS Injury Assessment  Goal: Absence of physical injury  8/23/2023 1312 by Mann Whitman RN  Outcome: Completed  8/23/2023 0603 by Jin Romano RN  Outcome: Progressing     Problem: Nutrition Deficit:  Goal: Optimize nutritional status  Outcome: Completed

## 2023-08-23 NOTE — PLAN OF CARE
Attestation and add on       I have discussed the care of Sandra Jara , including pertinent history and exam findings,      8/22/23    with the resident. I have seen and examined the patient and the key elements of all parts of the encounter have been performed by me . I agree with the assessment, plan and orders as documented by the resident. Hospital Problems             Last Modified POA    * (Principal) Left external iliac abscess 8/22/2023 Yes    History of prostate cancer 8/20/2023 Yes    Hypertension 8/20/2023 Yes           -- She has developed erythematous rash with vesicular center  Most likely allergic reaction to antibiotic  Was on Cipro and Flagyl  I suspect is the Cipro  We will evaluate allergic to Cipro  ID consulted for antibiotic chest  Patient had left external iliac abscess which was drained and patient is improving  Will benefit from prednisone 20 mg daily for 5 to 7 days          Medications: Allergies:  No Known Allergies    Current Meds:   Scheduled Meds:    [Held by provider] ciprofloxacin  400 mg IntraVENous Q12H    [Held by provider] metroNIDAZOLE  500 mg IntraVENous Q8H    sodium chloride flush  10 mL IntraCATHeter BID    sodium chloride flush  5-40 mL IntraVENous 2 times per day    enoxaparin  40 mg SubCUTAneous Daily    rosuvastatin  40 mg Oral QPM     Continuous Infusions:    sodium chloride      sodium chloride 100 mL/hr at 08/22/23 0059     PRN Meds: diphenhydrAMINE, melatonin, sodium chloride flush, sodium chloride, ondansetron **OR** ondansetron, polyethylene glycol, acetaminophen **OR** acetaminophen      MD CHAD Foote [address], [address].    Phone [phone]   Fax: [phone]  Answering Service: [phone]

## 2023-08-26 LAB
MICROORGANISM SPEC CULT: ABNORMAL
MICROORGANISM SPEC CULT: ABNORMAL
MICROORGANISM/AGENT SPEC: ABNORMAL
SPECIMEN DESCRIPTION: ABNORMAL

## 2023-08-28 ENCOUNTER — OFFICE VISIT (OUTPATIENT)
Age: 73
End: 2023-08-28
Payer: MEDICARE

## 2023-08-28 DIAGNOSIS — Z85.46 HISTORY OF PROSTATE CANCER: ICD-10-CM

## 2023-08-28 DIAGNOSIS — R18.8 PELVIC FLUID COLLECTION: Primary | ICD-10-CM

## 2023-08-28 DIAGNOSIS — N52.8 OTHER MALE ERECTILE DYSFUNCTION: ICD-10-CM

## 2023-08-28 PROCEDURE — 99024 POSTOP FOLLOW-UP VISIT: CPT | Performed by: SPECIALIST

## 2023-08-28 PROCEDURE — 81003 URINALYSIS AUTO W/O SCOPE: CPT | Performed by: SPECIALIST

## 2023-08-28 NOTE — PROGRESS NOTES
Rayetta Phalen [facility], [address] [place] [address] Urology Office Progress Note    Patient:  Mandy Lux  YOB: 1950  Date: 8/28/2023    HISTORY OF PRESENT ILLNESS:   The patient is a 68 y.o. male  Patient here to have his left sided pelvic drain pulled since it has had minimal output the past several days. Patient scheduled to Dr. Minesh Quiñones on Wednesday and she will order repeat imaging. Patient instructed to avoid bladder irritants in diet such as coffee, tea, caffeine, alcohol, carbonated beverages, spicy/acidic foods to decrease urgency. Patient given a list of these to avoid.      Last AUA Symptom Score (QOL): 2 (1)    Summary of old records:   Prostate cancer with elevated PSA of 4.52 on 11/4/17; 1/24/18 bx neg; 2/16/23 MRI (72 mL, PIRADS 4, Left mid apical TZ); 4/4/23 MR fusion biopsy: G3+4=7 LLA (17%), LA, target (27%), G4+3=7 LA (27%); 4/14/23 PSMA PET CT: ? R masseter/cheek activity of unclear significance; 6/16/23 RALP T2N0 neg margins G3+4=7; Left external iliac fluid collection on 8/30/23 CT, drain placed (sees Aouad)  Peyronie's: mild to L, 25 degrees, no pain  ED: Tadalafil (Cialis) 20 mg prn 1/26/23; added Tadalafil (Cialis) 5 mg po qd 7/24/23  JEANNINE:3-4 ppd, Kegels    Additional History: none    Procedures Today: N/A    Urinalysis today:  Results for POC orders placed in visit on 08/28/23   POCT Urinalysis No Micro (Auto)   Result Value Ref Range    Color, UA yellow     Clarity, UA clear     Glucose, UA POC neg     Bilirubin, UA      Ketones, UA      Spec Grav, UA      Blood, UA POC neg     pH, UA      Protein, UA POC neg     Urobilinogen, UA      Leukocytes, UA neg     Nitrite, UA neg        Last several PSA's:  Lab Results   Component Value Date    PSA <0.02 08/20/2023    PSA <0.06 07/18/2023    PSA 8.45 11/16/2022       Last total testosterone:  No results found for: TESTOSTERONE    Last BUN and creatinine:  Lab Results   Component Value Date    BUN 10 08/23/2023     Lab

## 2023-08-30 ENCOUNTER — OFFICE VISIT (OUTPATIENT)
Dept: INFECTIOUS DISEASES | Age: 73
End: 2023-08-30
Payer: MEDICARE

## 2023-08-30 VITALS
TEMPERATURE: 98 F | HEIGHT: 72 IN | DIASTOLIC BLOOD PRESSURE: 63 MMHG | HEART RATE: 53 BPM | BODY MASS INDEX: 26.14 KG/M2 | WEIGHT: 193 LBS | SYSTOLIC BLOOD PRESSURE: 101 MMHG

## 2023-08-30 DIAGNOSIS — K65.1 PELVIC ABSCESS IN MALE (HCC): Primary | ICD-10-CM

## 2023-08-30 PROCEDURE — 99214 OFFICE O/P EST MOD 30 MIN: CPT | Performed by: INTERNAL MEDICINE

## 2023-08-30 PROCEDURE — 1123F ACP DISCUSS/DSCN MKR DOCD: CPT | Performed by: INTERNAL MEDICINE

## 2023-08-30 PROCEDURE — 3074F SYST BP LT 130 MM HG: CPT | Performed by: INTERNAL MEDICINE

## 2023-08-30 PROCEDURE — 3078F DIAST BP <80 MM HG: CPT | Performed by: INTERNAL MEDICINE

## 2023-08-30 ASSESSMENT — ENCOUNTER SYMPTOMS
ABDOMINAL DISTENTION: 0
COLOR CHANGE: 0
EYE DISCHARGE: 0
APNEA: 0

## 2023-08-30 NOTE — PROGRESS NOTES
[facility] - Initial Consult Note  Today's Date: 8/30/2023    Impression :   Post STV 8/20/23  Patient is status post laparoscopic prostatectomy on 6/16/23  Abdominal pain and weight loss,  intra-abdominal abscess of 5.5 cm  Status post drainage and pigtail catheter 8/21/23  Growth of group B Streptococcus   Patient was on Cipro and Flagyl, developed rash after antibiotics were started  Elevated CRP -improving    Recommendations   Get CTAP soon  Decide on drainage per results  Keep amox for now tentatively till 9/13/23  I ll call the pt w results- AB course to be shortened if CT neg     Diagnosis Orders   1. Pelvic abscess in male [facility])            Return in about 4 weeks (around 9/27/2023).       History of Present Illness:   Zeeshan Adams is a 68y.o.-year-old  male who presents with   Chief Complaint   Patient presents with    Frequent Infections     Follow up [address] STV 8/20/23  Patient is status post laparoscopic prostatectomy on 6/16  Abdominal pain and weight loss,  intra-abdominal abscess of 5.5 cm  Status post drainage and pigtail catheter 8/21  Growth of group B Streptococcus   Patient was on Cipro and Flagyl, developed rash after antibiotics were started  Elevated CRP -improving    Plan at DC 8/23/23  Stop Cipro and Flagyl for now  Lucas Gann for DC 3 weeks of amoxicillin 1 g q 12, till 9/13 -  I ll see in office in 2 weeks  Would need a CT AP once the drainage decreases, to decide if the drain will be pulled or repositioned  Reconsiled - ok for DC    Visit 8/30/23  Dr Shaji Cam pulled the drain due to small output - pain better and can walk better  No CT not done yet  Still on amoxicillin 1 g q 12 till 9/13/23    Exam - neg for pain      Get CTAP soon  Decide on drainage per results  Keep amox for now tentatively till 9/13/23            I have personally reviewed the past medical history, past surgical history, medications, social history, and family history,

## 2023-09-06 ENCOUNTER — HOSPITAL ENCOUNTER (OUTPATIENT)
Dept: CT IMAGING | Age: 73
Discharge: HOME | End: 2023-09-08
Attending: INTERNAL MEDICINE
Payer: MEDICARE

## 2023-09-06 DIAGNOSIS — K65.1 PELVIC ABSCESS IN MALE (HCC): ICD-10-CM

## 2023-09-06 PROCEDURE — 6360000004 HC RX CONTRAST MEDICATION: Performed by: INTERNAL MEDICINE

## 2023-09-06 PROCEDURE — 74177 CT ABD & PELVIS W/CONTRAST: CPT

## 2023-09-06 RX ADMIN — IOPAMIDOL 75 ML: 755 INJECTION, SOLUTION INTRAVENOUS at 07:58

## 2023-09-08 ENCOUNTER — TELEPHONE (OUTPATIENT)
Dept: INFECTIOUS DISEASES | Age: 73
End: 2023-09-08

## 2023-09-08 NOTE — TELEPHONE ENCOUNTER
CT is in process . I will look again Monday for results . Just an FYI if you want to keep an eye out. Thanks.

## 2023-09-11 DIAGNOSIS — K65.1 PELVIC ABSCESS IN MALE (HCC): Primary | ICD-10-CM

## 2023-09-11 RX ORDER — AMOXICILLIN 500 MG/1
1000 CAPSULE ORAL 2 TIMES DAILY
Qty: 84 CAPSULE | Refills: 0 | Status: SHIPPED | OUTPATIENT
Start: 2023-09-11 | End: 2023-10-02

## 2023-10-02 ENCOUNTER — HOSPITAL ENCOUNTER (OUTPATIENT)
Age: 73
Discharge: HOME | End: 2023-10-02
Payer: MEDICARE

## 2023-10-02 ENCOUNTER — HOSPITAL ENCOUNTER (OUTPATIENT)
Dept: CT IMAGING | Age: 73
Discharge: HOME | End: 2023-10-04
Attending: INTERNAL MEDICINE
Payer: MEDICARE

## 2023-10-02 DIAGNOSIS — K65.1 PELVIC ABSCESS IN MALE (HCC): ICD-10-CM

## 2023-10-02 LAB
CREAT SERPL-MCNC: 0.8 MG/DL (ref 0.7–1.2)
GFR SERPL CREATININE-BSD FRML MDRD: >60 ML/MIN/1.73M2

## 2023-10-02 PROCEDURE — 36415 COLL VENOUS BLD VENIPUNCTURE: CPT

## 2023-10-02 PROCEDURE — 74177 CT ABD & PELVIS W/CONTRAST: CPT

## 2023-10-02 PROCEDURE — 82565 ASSAY OF CREATININE: CPT

## 2023-10-02 PROCEDURE — 6360000004 HC RX CONTRAST MEDICATION: Performed by: INTERNAL MEDICINE

## 2023-10-02 RX ADMIN — IOPAMIDOL 75 ML: 755 INJECTION, SOLUTION INTRAVENOUS at 08:28

## 2023-10-04 ENCOUNTER — OFFICE VISIT (OUTPATIENT)
Dept: INFECTIOUS DISEASES | Age: 73
End: 2023-10-04
Payer: MEDICARE

## 2023-10-04 VITALS
BODY MASS INDEX: 26.66 KG/M2 | HEIGHT: 72 IN | DIASTOLIC BLOOD PRESSURE: 68 MMHG | HEART RATE: 58 BPM | SYSTOLIC BLOOD PRESSURE: 109 MMHG | WEIGHT: 196.8 LBS | TEMPERATURE: 97.7 F

## 2023-10-04 DIAGNOSIS — K65.1 PELVIC ABSCESS IN MALE (HCC): Primary | ICD-10-CM

## 2023-10-04 PROCEDURE — 3078F DIAST BP <80 MM HG: CPT | Performed by: INTERNAL MEDICINE

## 2023-10-04 PROCEDURE — 99214 OFFICE O/P EST MOD 30 MIN: CPT | Performed by: INTERNAL MEDICINE

## 2023-10-04 PROCEDURE — 3074F SYST BP LT 130 MM HG: CPT | Performed by: INTERNAL MEDICINE

## 2023-10-04 PROCEDURE — 1123F ACP DISCUSS/DSCN MKR DOCD: CPT | Performed by: INTERNAL MEDICINE

## 2023-10-04 ASSESSMENT — ENCOUNTER SYMPTOMS
APNEA: 0
EYE DISCHARGE: 0
EYE REDNESS: 0
ABDOMINAL DISTENTION: 0
EYE PAIN: 0
COLOR CHANGE: 0

## 2023-10-04 NOTE — PROGRESS NOTES
[facility] - Initial Consult Note  Today's Date: 10/4/2023    Impression :   Post STV 8/20/23  Patient is status post laparoscopic prostatectomy on 6/16/23  Abdominal pain and weight loss,  intra-abdominal abscess of 5.5 cm  Status post drainage and pigtail catheter 8/21/23  Growth of group B Streptococcus   Patient was on Cipro and Flagyl, developed rash after antibiotics were started  Elevated CRP -improving    Recommendations   Get CTAP soon  Decide on drainage per results  Keep amox for now tentatively till 9/13/23  I ll call the pt w results- AB course to be shortened if CT neg    Plan for visit 10/4/23  Stop AB  Keep probiotics x 1 more week then stop  see me PRN     Diagnosis Orders   1. Pelvic abscess in [facility])            Return if symptoms worsen or fail to improve.       History of Present Illness:   Jeri Reed is a 68y.o.-year-old  male who presents with   Chief Complaint   Patient presents with    Frequent Infections     4 week f/u   Post STV 8/20/23  Patient is status post laparoscopic prostatectomy on 6/16  Abdominal pain and weight loss,  intra-abdominal abscess of 5.5 cm  Status post drainage and pigtail catheter 8/21  Growth of group B Streptococcus   Patient was on Cipro and Flagyl, developed rash after antibiotics were started  Elevated CRP -improving    Plan at DC 8/23/23  Stop Cipro and Flagyl for now  [facility] for [place] 3 weeks of amoxicillin 1 g q 12, till 9/13 -  I ll see in office in 2 weeks  Would need a CT AP once the drainage decreases, to decide if the drain will be pulled or repositioned  Reconsiled - ok for DC    Visit 8/30/23  Dr Thomas Allen pulled the drain due to small output - pain better and can walk better  No CT not done yet  Still on amoxicillin 1 g q 12 till 9/13/23    Exam - neg for pain      Get CTAP soon  Decide on drainage per results  Keep amox for now tentatively till 9/13/23      Visit 10/4/23  Lots of gas  On amoxicillin  CT AP done

## 2023-10-09 ENCOUNTER — OFFICE VISIT (OUTPATIENT)
Age: 73
End: 2023-10-09
Payer: MEDICARE

## 2023-10-09 VITALS — WEIGHT: 196 LBS | HEIGHT: 72 IN | BODY MASS INDEX: 26.55 KG/M2

## 2023-10-09 DIAGNOSIS — R35.1 NOCTURIA: ICD-10-CM

## 2023-10-09 DIAGNOSIS — N52.8 OTHER MALE ERECTILE DYSFUNCTION: Primary | ICD-10-CM

## 2023-10-09 DIAGNOSIS — Z85.46 HISTORY OF PROSTATE CANCER: ICD-10-CM

## 2023-10-09 LAB
BILIRUBIN, POC: NORMAL
BLOOD URINE, POC: NORMAL
CLARITY, POC: CLEAR
COLOR, POC: YELLOW
GLUCOSE URINE, POC: NORMAL
KETONES, POC: NORMAL
LEUKOCYTE EST, POC: NORMAL
NITRITE, POC: NORMAL
PH, POC: NORMAL
POST VOID RESIDUAL (PVR): NORMAL ML
PROTEIN, POC: NORMAL
SPECIFIC GRAVITY, POC: NORMAL
UROBILINOGEN, POC: NORMAL

## 2023-10-09 PROCEDURE — 99214 OFFICE O/P EST MOD 30 MIN: CPT | Performed by: SPECIALIST

## 2023-10-09 PROCEDURE — 51798 US URINE CAPACITY MEASURE: CPT | Performed by: SPECIALIST

## 2023-10-09 PROCEDURE — 81003 URINALYSIS AUTO W/O SCOPE: CPT | Performed by: SPECIALIST

## 2023-10-09 PROCEDURE — 1123F ACP DISCUSS/DSCN MKR DOCD: CPT | Performed by: SPECIALIST

## 2023-10-09 NOTE — PROGRESS NOTES
Aura Sample [facility]em[facility] [address] Urology Office Progress Note    Patient:  Nonnie Leventhal  YOB: 1950  Date: 10/9/23    HISTORY OF PRESENT ILLNESS:   The patient is a 68 y.o. male  Patient doing well after 6/16/23 Robotic assisted laparoscopic radical prostatectomy. Patient's 10/8/23 CT shows no evidence of persistent, drain-able pelvic fluid collection. Patient's Stress urinary incontinence is minimal at this point. Continue Kegel exercises to maintain improvement. Patient unable to tolerate Tadalafil (Cialis) 5 mg po qd for Erectile Dysfunction due to muscle aches. Patient given an Rx for Triple mix intracorporeal injection Rx for ED. Patient instructed to limit fluid intake 2-3 hours prior to bedtime to minimize nocturia or nocturnal incontinence. Lower urinary tract symptoms: urgency, frequency, decreased urinary stream, and nocturia, 2 times per night   Last AUA Symptom Score (QOL): 2 (1)  Today's AUA Symptom Score (QOL): 7 (2)    Summary of old records:   Prostate cancer with elevated PSA of 4.52 on 11/4/17; 1/24/18 bx neg; 2/16/23 MRI (72 mL, PIRADS 4, Left mid apical TZ); 4/4/23 MR fusion biopsy: G3+4=7 LLA (17%), LA, target (27%), G4+3=7 LA (27%); 4/14/23 PSMA PET CT: ? R masseter/cheek activity of unclear significance; 6/16/23 RALP T2N0 neg margins G3+4=7; Left external iliac fluid collection on 8/30/23 CT, drain placed (sees Aouad)  Peyronie's: mild to L, 25 degrees, no pain  ED: Tadalafil (Cialis) 5 mg qd, 20 mg prn (can't tolerate due to muscle aches);  Triple mix intracorporeal injection Rx for ED 10/9/23  JEANNINE:3-4 ppd, Kegels    Additional History: none    Procedures Today: Postvoid Residual:  Post-void Residual by bladder scanner: 0 mL     Urinalysis today:  Results for POC orders placed in visit on 10/09/23   POCT Urinalysis No Micro (Auto)   Result Value Ref Range    Color, UA yellow     Clarity, UA clear     Glucose, UA POC neg     Bilirubin, UA

## 2023-10-10 PROBLEM — R35.1 NOCTURIA: Status: ACTIVE | Noted: 2023-10-10

## 2023-10-16 ENCOUNTER — OFFICE VISIT (OUTPATIENT)
Age: 73
End: 2023-10-16
Payer: MEDICARE

## 2023-10-16 DIAGNOSIS — Z85.46 HISTORY OF PROSTATE CANCER: ICD-10-CM

## 2023-10-16 DIAGNOSIS — N52.8 OTHER MALE ERECTILE DYSFUNCTION: Primary | ICD-10-CM

## 2023-10-16 PROBLEM — C61 PROSTATE CANCER (HCC): Status: RESOLVED | Noted: 2023-04-21 | Resolved: 2023-10-16

## 2023-10-16 PROCEDURE — 1123F ACP DISCUSS/DSCN MKR DOCD: CPT | Performed by: SPECIALIST

## 2023-10-16 PROCEDURE — 99213 OFFICE O/P EST LOW 20 MIN: CPT | Performed by: SPECIALIST

## 2023-10-16 NOTE — PROGRESS NOTES
Jagjit Nick, [address] [place] [address] Urology Office Progress Note    Patient:  Neo Ferrer  YOB: 1950  Date: 10/16/2023    HISTORY OF PRESENT ILLNESS:   The patient is a 68 y.o. male  Patient here to shown how to use Triple mix intracorporeal injection Rx for ED. He was able to inject himself without difficulty. He will escalate the dose as needed. Summary of old records:   Prostate cancer with elevated PSA of 4.52 on 11/4/17; 1/24/18 bx neg; 2/16/23 MRI (72 mL, PIRADS 4, Left mid apical TZ); 4/4/23 MR fusion biopsy: G3+4=7 LLA (17%), LA, target (27%), G4+3=7 LA (27%); 4/14/23 PSMA PET CT: ? R masseter/cheek activity of unclear significance; 6/16/23 RALP T2N0 neg margins G3+4=7; Left external iliac fluid collection on 8/30/23 CT, drain placed (sees Aouad)  Peyronie's: mild to L, 25 degrees, no pain  ED: Tadalafil (Cialis) 5 mg qd, 20 mg prn (can't tolerate due to muscle aches); Triple mix intracorporeal injection Rx for ED 10/9/23  JEANNINE:3-4 ppd, Kegels    Additional History: none    Procedures Today: N/A    Urinalysis today:  No results found for this visit on 10/16/23. Last several PSA's:  Lab Results   Component Value Date    PSA <0.02 08/20/2023    PSA <0.06 07/18/2023    PSA 8.45 11/16/2022       Last total testosterone:  No results found for: \"TESTOSTERONE\"    Last BUN and creatinine:  Lab Results   Component Value Date    BUN 10 08/23/2023     Lab Results   Component Value Date    CREATININE 0.8 10/02/2023       Last CBC:  Lab Results   Component Value Date    WBC 6.1 08/23/2023    HGB 11.8 (L) 08/23/2023    HCT 37.0 (L) 08/23/2023    MCV 97.1 08/23/2023     08/23/2023       Additional Lab/Culture results: none    Imaging Reviewed during this Office Visit: none  (results were independently reviewed by physician and radiology report verified)    Physical Exam:    There were no vitals filed for this visit.   There is no height or weight on file to calculate

## 2024-01-05 LAB — PROSTATE SPECIFIC ANTIGEN: <0.06 NG/ML

## 2024-01-08 ENCOUNTER — OFFICE VISIT (OUTPATIENT)
Age: 74
End: 2024-01-08
Payer: MEDICARE

## 2024-01-08 VITALS — WEIGHT: 196 LBS | BODY MASS INDEX: 26.55 KG/M2 | HEIGHT: 72 IN

## 2024-01-08 DIAGNOSIS — N52.8 OTHER MALE ERECTILE DYSFUNCTION: Primary | ICD-10-CM

## 2024-01-08 DIAGNOSIS — Z85.46 HISTORY OF PROSTATE CANCER: ICD-10-CM

## 2024-01-08 PROCEDURE — 1123F ACP DISCUSS/DSCN MKR DOCD: CPT | Performed by: SPECIALIST

## 2024-01-08 PROCEDURE — 81003 URINALYSIS AUTO W/O SCOPE: CPT | Performed by: SPECIALIST

## 2024-01-08 PROCEDURE — 99214 OFFICE O/P EST MOD 30 MIN: CPT | Performed by: SPECIALIST

## 2024-01-08 NOTE — PROGRESS NOTES
Brody Kwon MD FACS    [facility] Urology Office Progress Note    Patient:  Grant Chavez  YOB: 1950  Date: 1/8/2024    HISTORY OF PRESENT ILLNESS:   The patient is a 73 y.o. male  No evidence of prostate cancer recurrence s/p 6/16/23 Robotic assisted laparoscopic radical prostatectomy since PSA is <0.06.    Patient tried Triple mix intracorporeal injection Rx for ED but it was too anxiety producing to do the injection.  Patient to keep trying Tadalafil (Cialis) 20 mg prn ED.    Lower urinary tract symptoms: frequency and nocturia, 1 times per night   Last AUA Symptom Score (QOL): 7 (2)  Today's AUA Symptom Score (QOL): 2 (1)    Summary of old records:   Prostate cancer with elevated PSA of 4.52 on 11/4/17; 1/24/18 bx neg; 2/16/23 MRI (72 mL, PIRADS 4, Left mid apical TZ); 4/4/23 MR fusion biopsy: G3+4=7 LLA (17%), LA, target (27%), G4+3=7 LA (27%); 4/14/23 PSMA PET CT: ? R masseter/cheek activity of unclear significance; 6/16/23 RALP T2N0 neg margins G3+4=7; Left external iliac fluid collection on 8/30/23 CT, drain placed (sees Aouad)  Peyronie's: mild to L, 25 degrees, no pain  ED: Tadalafil (Cialis) 5 mg qd, 20 mg prn (can't tolerate due to muscle aches); Triple mix intracorporeal injection Rx for ED 10/9/23  JEANNINE:3-4 ppd, Kegels    Additional History: none    Procedures Today: N/A    Urinalysis today:  Results for POC orders placed in visit on 01/08/24   POCT Urinalysis No Micro (Auto)   Result Value Ref Range    Color, UA yellow     Clarity, UA clear     Glucose, UA POC neg     Bilirubin, UA      Ketones, UA      Spec Grav, UA      Blood, UA POC neg     pH, UA      Protein, UA POC trace     Urobilinogen, UA      Leukocytes, UA neg     Nitrite, UA neg        Last several PSA's:  Lab Results   Component Value Date    PSA <0.06 01/05/2024    PSA <0.02 08/20/2023    PSA <0.06 07/18/2023       Last total testosterone:  No results found for: \"TESTOSTERONE\"    Last BUN and

## 2024-05-29 DIAGNOSIS — Z85.46 HISTORY OF PROSTATE CANCER: ICD-10-CM

## 2024-05-29 LAB — PROSTATE SPECIFIC ANTIGEN: 0.04 NG/ML

## 2024-07-22 ENCOUNTER — OFFICE VISIT (OUTPATIENT)
Age: 74
End: 2024-07-22
Payer: MEDICARE

## 2024-07-22 VITALS — HEIGHT: 72 IN | WEIGHT: 196 LBS | BODY MASS INDEX: 26.55 KG/M2

## 2024-07-22 DIAGNOSIS — Z85.46 HISTORY OF PROSTATE CANCER: ICD-10-CM

## 2024-07-22 DIAGNOSIS — N52.8 OTHER MALE ERECTILE DYSFUNCTION: Primary | ICD-10-CM

## 2024-07-22 PROCEDURE — 99214 OFFICE O/P EST MOD 30 MIN: CPT | Performed by: SPECIALIST

## 2024-07-22 PROCEDURE — 1123F ACP DISCUSS/DSCN MKR DOCD: CPT | Performed by: SPECIALIST

## 2024-07-22 PROCEDURE — 81003 URINALYSIS AUTO W/O SCOPE: CPT | Performed by: SPECIALIST

## 2024-07-22 RX ORDER — ATORVASTATIN CALCIUM 80 MG/1
TABLET, FILM COATED ORAL
COMMUNITY
Start: 2024-05-02

## 2024-07-22 RX ORDER — METOPROLOL SUCCINATE 25 MG/1
TABLET, EXTENDED RELEASE ORAL
COMMUNITY
Start: 2024-04-29

## 2024-07-22 RX ORDER — SILDENAFIL 100 MG/1
100 TABLET, FILM COATED ORAL PRN
Qty: 30 TABLET | Refills: 11 | Status: SHIPPED | OUTPATIENT
Start: 2024-07-22

## 2024-07-22 NOTE — PROGRESS NOTES
results found for: \"TESTOSTERONE\"    Last BUN and creatinine:  Lab Results   Component Value Date    BUN 17 11/01/2023     Lab Results   Component Value Date    CREATININE 0.75 11/01/2023       Last CBC:  Lab Results   Component Value Date    WBC 4.45 11/01/2023    HGB 13.8 (L) 11/01/2023    HCT 41.2 (L) 11/01/2023    MCV 92.0 11/01/2023     11/01/2023       Additional Lab/Culture results: none    Imaging Reviewed during this Office Visit: none  (results were independently reviewed by physician and radiology report verified)    Physical Exam:    There were no vitals filed for this visit.  Body mass index is 26.58 kg/m².  Patient is a 74 y.o. male in no acute distress and alert and oriented to person, place and time.    Assessment and Plan   Assessment   1. Other male erectile dysfunction    2. History of prostate cancer            Plan    Return in about 6 months (around 1/22/2025) for PSA.  No evidence of prostate cancer recurrence s/p 6/16/23 Robotic assisted laparoscopic radical prostatectomy since PSA is 0.04.  Will repeat this in 6 months.  Patient can't take Tadalafil (Cialis) 20 mg prn ED due to muscle aches.  Patient given an Rx for generic sildenafil 100 mg (1/2-1 tab) prn ED.           Brody Kwon MD FACS  7/22/2024 2:04 PM    2024 Quality Measure Documentation: N/A  Social History     Tobacco Use   Smoking Status Some Days    Types: Cigars   Smokeless Tobacco Never     (If patient a smoker, smoking cessation counseling offered)

## 2025-01-17 LAB — PROSTATE SPECIFIC ANTIGEN: 0.01 NG/ML

## 2025-01-20 ENCOUNTER — OFFICE VISIT (OUTPATIENT)
Age: 75
End: 2025-01-20
Payer: MEDICARE

## 2025-01-20 VITALS — WEIGHT: 196 LBS | BODY MASS INDEX: 26.55 KG/M2 | HEIGHT: 72 IN

## 2025-01-20 DIAGNOSIS — N52.8 OTHER MALE ERECTILE DYSFUNCTION: Primary | ICD-10-CM

## 2025-01-20 DIAGNOSIS — Z85.46 HISTORY OF PROSTATE CANCER: ICD-10-CM

## 2025-01-20 DIAGNOSIS — N39.3 MALE STRESS INCONTINENCE: ICD-10-CM

## 2025-01-20 LAB
BILIRUBIN, POC: NORMAL
BLOOD URINE, POC: NORMAL
CLARITY, POC: CLEAR
COLOR, POC: YELLOW
GLUCOSE URINE, POC: NORMAL MG/DL
KETONES, POC: NORMAL
LEUKOCYTE EST, POC: NORMAL
NITRITE, POC: NORMAL
PH, POC: NORMAL
PROTEIN, POC: NORMAL MG/DL
SPECIFIC GRAVITY, POC: NORMAL
UROBILINOGEN, POC: NORMAL

## 2025-01-20 PROCEDURE — 1159F MED LIST DOCD IN RCRD: CPT | Performed by: SPECIALIST

## 2025-01-20 PROCEDURE — 81003 URINALYSIS AUTO W/O SCOPE: CPT | Performed by: SPECIALIST

## 2025-01-20 PROCEDURE — 1123F ACP DISCUSS/DSCN MKR DOCD: CPT | Performed by: SPECIALIST

## 2025-01-20 PROCEDURE — 99213 OFFICE O/P EST LOW 20 MIN: CPT | Performed by: SPECIALIST

## 2025-01-20 NOTE — PROGRESS NOTES
Brody Kwon MD [facility] Urology Office Progress Note    Patient:  Grant Chavez  YOB: 1950  Date: 1/20/2025    HISTORY OF PRESENT ILLNESS:   The patient is a 74 y.o. male  No evidence of prostate cancer recurrence s/p 6/16/23 Robotic assisted laparoscopic radical prostatectomy since PSA is 0.01.  Will repeat a PSA in 6 months.   Not interested in medical Rx for ED since patient not sexually active.   Patient has mild residual Stress urinary incontinence.  Continue Kegel exercises for stress urinary incontinence.  Also discussed the role of pelvic floor muscle rehabilitation with PT.  Lower urinary tract symptoms: nocturia, 1 times per night   Last AUA Symptom Score (QOL): 3 (1)  Today's AUA Symptom Score (QOL): 1 (1)    Summary of old records:   Prostate cancer with elevated PSA of 4.52 on 11/4/17; 1/24/18 bx neg; 2/16/23 MRI (72 mL, PIRADS 4, Left mid apical TZ); 4/4/23 MR fusion biopsy: G3+4=7 LLA (17%), LA, target (27%), G4+3=7 LA (27%); 4/14/23 PSMA PET CT: ? R masseter/cheek activity of unclear significance; 6/16/23 RALP T2N0 neg margins G3+4=7; Left external iliac fluid collection on 8/30/23 CT, drain placed (sees Aouad)  Peyronie's: mild to L, 25 degrees, no pain  ED: Tadalafil (Cialis) 5 mg qd, 20 mg prn (can't tolerate due to muscle aches); doesn't want to do Triple mix intracorporeal injection; generic sildenafil 100 mg (1/2-1 tab) prn ED; currently inactive  JEANNINE, Kegels    Additional History: none    Procedures Today: N/A    Urinalysis today:  Results for orders placed or performed in visit on 01/20/25   POCT Urinalysis No Micro (Auto)   Result Value Ref Range    Color, UA yellow     Clarity, UA clear     Glucose, UA POC neg mg/dL    Bilirubin, UA      Ketones, UA      Spec Grav, UA      Blood, UA POC neg     pH, UA      Protein, UA POC neg mg/dL    Urobilinogen, UA      Leukocytes, UA neg     Nitrite, UA neg        Last several PSA's:  Lab Results

## 2025-07-17 DIAGNOSIS — Z85.46 HISTORY OF PROSTATE CANCER: Primary | ICD-10-CM

## (undated) DEVICE — METER,URINE,400ML,DRAIN BAG,L/F,LL: Brand: MEDLINE

## (undated) DEVICE — CONTAINER,SPECIMEN,4OZ,OR STRL: Brand: MEDLINE

## (undated) DEVICE — BLADELESS OBTURATOR: Brand: WECK VISTA

## (undated) DEVICE — TOWEL,OR,DSP,ST,NATURAL,DLX,4/PK,20PK/CS: Brand: MEDLINE

## (undated) DEVICE — BLADE CLIPPER GEN PURP NS

## (undated) DEVICE — GOWN,SIRUS,NONRNF,SETINSLV,XL,20/CS: Brand: MEDLINE

## (undated) DEVICE — SUTURE STRATAFIX SPRL PDS + SZ 0 L9IN ABSRB VLT CT-1 L36MM SXPP1B455

## (undated) DEVICE — DRESSING TRNSPAR W5XL4.5IN FLM SHT SEMIPERMEABLE WIND

## (undated) DEVICE — SUTURE MCRYL SZ 4-0 L18IN ABSRB UD L16MM PC-3 3/8 CIR PRIM Y845G

## (undated) DEVICE — DRAIN,WOUND,15FR,3/16,FULL-FLUTED: Brand: MEDLINE

## (undated) DEVICE — RESERVOIR,SUCTION,100CC,SILICONE: Brand: MEDLINE

## (undated) DEVICE — LIQUIBAND RAPID ADHESIVE 36/CS 0.8ML: Brand: MEDLINE

## (undated) DEVICE — NEEDLE BX ASPIR SPNL TIPCM MRK AND NDL STP 22GAX20CM

## (undated) DEVICE — GOWN,AURORA,NONREINFORCED,LARGE: Brand: MEDLINE

## (undated) DEVICE — NEEDLE HYPO 25GA L1.5IN BLU POLYPR HUB S STL REG BVL STR

## (undated) DEVICE — TIP COVER ACCESSORY

## (undated) DEVICE — TISSUE RETRIEVAL SYSTEM: Brand: INZII RETRIEVAL SYSTEM

## (undated) DEVICE — SHEET,DRAPE,70X100,STERILE: Brand: MEDLINE

## (undated) DEVICE — PROTECTOR ULN NRV PUR FOAM HK LOOP STRP ANATOMICALLY

## (undated) DEVICE — INSUFFLATION NEEDLE TO ESTABLISH PNEUMOPERITONEUM.: Brand: INSUFFLATION NEEDLE

## (undated) DEVICE — SUTURE ETHLN SZ 3-0 L18IN NONABSORBABLE BLK L24MM PS-1 3/8 1663G

## (undated) DEVICE — CATHETER URETH 20FR BLLN 30CC 2 W F INF CTRL BARDX

## (undated) DEVICE — SOLUTION ANTIFOG VIS SYS CLEARIFY LAPSCP

## (undated) DEVICE — GLOVE SURG SZ 65 THK91MIL LTX FREE SYN POLYISOPRENE

## (undated) DEVICE — SCISSOR SURG METZ CRV TIP

## (undated) DEVICE — STRAP,POSITIONING,KNEE/BODY,FOAM,4X60": Brand: MEDLINE

## (undated) DEVICE — COUNTER NDL 10 COUNT HLD 20 FOAM BLK SGL MAG

## (undated) DEVICE — SUTURE VCRL SZ 0 L27IN ABSRB UD L36MM CT-1 1/2 CIR J260H

## (undated) DEVICE — GARMENT,MEDLINE,DVT,INT,CALF,MED, GEN2: Brand: MEDLINE

## (undated) DEVICE — TRI-LUMEN FILTERED TUBE SET WITH ACTIVATED CHARCOAL FILTER: Brand: AIRSEAL

## (undated) DEVICE — ELECTRODE PT RET AD L9FT HI MOIST COND ADH HYDRGEL CORDED

## (undated) DEVICE — PAD PT POS 36 IN SURGYPAD DISP

## (undated) DEVICE — SEALANT TISS 10 CC FIBRIN VISTASEAL

## (undated) DEVICE — SUTURE STRATAFIX SPRL PDS + VLT 3-0 15CM DISPOSABLE/SNGLE SXPP1B420

## (undated) DEVICE — Device

## (undated) DEVICE — SEAL

## (undated) DEVICE — GAUZE,SPONGE,FLUFF,6"X6.75",STRL,5/TRAY: Brand: MEDLINE

## (undated) DEVICE — BLADE,CARBON-STEEL,15,STRL,DISPOSABLE,TB: Brand: MEDLINE

## (undated) DEVICE — SYRINGE, LUER LOCK, 10ML: Brand: MEDLINE

## (undated) DEVICE — GLOVE ORANGE PI 7   MSG9070

## (undated) DEVICE — APPLICATOR LAP 45CM FLX 2 VISTASEAL

## (undated) DEVICE — ELECTRO LUBE IS A SINGLE PATIENT USE DEVICE THAT IS INTENDED TO BE USED ON ELECTROSURGICAL ELECTRODES TO REDUCE STICKING.: Brand: KEY SURGICAL ELECTRO LUBE

## (undated) DEVICE — SUTURE STRATAFIX SPRL PDS + SZ 3 0 L6IN ABSRB VLT RB 1 L17MM SXPP1B422

## (undated) DEVICE — SHEET DRAPE FULL 70X100

## (undated) DEVICE — TROCAR: Brand: KII FIOS FIRST ENTRY

## (undated) DEVICE — SUTURE ABSORBABLE BRAIDED 0 CT-1 8X27 IN UD VICRYL JJ41G

## (undated) DEVICE — ARM DRAPE

## (undated) DEVICE — APPLICATOR MEDICATED 26 CC SOLUTION HI LT ORNG CHLORAPREP

## (undated) DEVICE — GLOVE ORANGE PI 8   MSG9080

## (undated) DEVICE — STRAP,CATHETER,ELASTIC,HOOK&LOOP: Brand: MEDLINE

## (undated) DEVICE — SUTURE STRATAFIX SYMMETRIC PDS + SZ 0 L18IN ABSRB L36MM SXPP1A401

## (undated) DEVICE — AIRSEAL 12 MM ACCESS PORT AND PALM GRIP OBTURATOR WITH BLADELESS OPTICAL TIP, 120 MM LENGTH: Brand: AIRSEAL